# Patient Record
Sex: FEMALE | Race: WHITE | Employment: OTHER | URBAN - METROPOLITAN AREA
[De-identification: names, ages, dates, MRNs, and addresses within clinical notes are randomized per-mention and may not be internally consistent; named-entity substitution may affect disease eponyms.]

---

## 2017-03-01 ENCOUNTER — APPOINTMENT (OUTPATIENT)
Dept: GENERAL RADIOLOGY | Age: 82
End: 2017-03-01
Payer: MEDICARE

## 2017-03-01 ENCOUNTER — APPOINTMENT (OUTPATIENT)
Dept: CT IMAGING | Age: 82
End: 2017-03-01
Payer: MEDICARE

## 2017-03-01 ENCOUNTER — HOSPITAL ENCOUNTER (EMERGENCY)
Age: 82
Discharge: HOME | End: 2017-03-01
Attending: EMERGENCY MEDICINE
Payer: MEDICARE

## 2017-03-01 VITALS
BODY MASS INDEX: 23.54 KG/M2 | HEART RATE: 60 BPM | TEMPERATURE: 97.6 F | WEIGHT: 150 LBS | DIASTOLIC BLOOD PRESSURE: 55 MMHG | HEIGHT: 67 IN | RESPIRATION RATE: 16 BRPM | OXYGEN SATURATION: 98 % | SYSTOLIC BLOOD PRESSURE: 135 MMHG

## 2017-03-01 DIAGNOSIS — R29.6 FREQUENT FALLS: Primary | ICD-10-CM

## 2017-03-01 DIAGNOSIS — M54.2 NECK PAIN ON LEFT SIDE: ICD-10-CM

## 2017-03-01 DIAGNOSIS — R07.81 RIB PAIN ON RIGHT SIDE: ICD-10-CM

## 2017-03-01 DIAGNOSIS — R51.9 SCALP TENDERNESS: ICD-10-CM

## 2017-03-01 LAB
-: ABNORMAL
AMORPHOUS: ABNORMAL
BACTERIA: ABNORMAL
BILIRUBIN URINE: NEGATIVE
CASTS UA: ABNORMAL /LPF
CHP ED QC CHECK: YES
COLOR: YELLOW
COMMENT UA: ABNORMAL
CRYSTALS, UA: ABNORMAL /HPF
EPITHELIAL CELLS UA: ABNORMAL /HPF
GLUCOSE BLD-MCNC: 140 MG/DL
GLUCOSE BLD-MCNC: 140 MG/DL (ref 65–105)
GLUCOSE URINE: NEGATIVE
KETONES, URINE: NEGATIVE
LEUKOCYTE ESTERASE, URINE: NEGATIVE
MUCUS: ABNORMAL
NITRITE, URINE: NEGATIVE
OTHER OBSERVATIONS UA: ABNORMAL
PH UA: 5.5 (ref 5–8)
PROTEIN UA: NEGATIVE
RBC UA: ABNORMAL /HPF
RENAL EPITHELIAL, UA: ABNORMAL /HPF
SPECIFIC GRAVITY UA: 1.02 (ref 1–1.03)
TRICHOMONAS: ABNORMAL
TURBIDITY: ABNORMAL
URINE HGB: NEGATIVE
UROBILINOGEN, URINE: NORMAL
WBC UA: ABNORMAL /HPF
YEAST: ABNORMAL

## 2017-03-01 PROCEDURE — 99284 EMERGENCY DEPT VISIT MOD MDM: CPT

## 2017-03-01 PROCEDURE — 71020 XR CHEST STANDARD TWO VW: CPT

## 2017-03-01 PROCEDURE — 70450 CT HEAD/BRAIN W/O DYE: CPT

## 2017-03-01 PROCEDURE — 81001 URINALYSIS AUTO W/SCOPE: CPT

## 2017-03-01 PROCEDURE — 82947 ASSAY GLUCOSE BLOOD QUANT: CPT

## 2017-03-01 ASSESSMENT — ENCOUNTER SYMPTOMS
DIARRHEA: 0
COUGH: 0
BACK PAIN: 0
EYE PAIN: 0
NAUSEA: 0
SHORTNESS OF BREATH: 0
SORE THROAT: 0
ABDOMINAL PAIN: 0
VOMITING: 0

## 2017-03-01 ASSESSMENT — PAIN SCALES - GENERAL: PAINLEVEL_OUTOF10: 5

## 2017-04-21 ENCOUNTER — HOSPITAL ENCOUNTER (OUTPATIENT)
Age: 82
Discharge: HOME | End: 2017-04-21
Payer: MEDICARE

## 2017-04-21 ENCOUNTER — HOSPITAL ENCOUNTER (OUTPATIENT)
Dept: GENERAL RADIOLOGY | Age: 82
Discharge: HOME | End: 2017-04-21
Payer: MEDICARE

## 2017-04-21 DIAGNOSIS — R29.6 EXCESSIVE FALLING: ICD-10-CM

## 2017-04-21 DIAGNOSIS — M54.2 CERVICALGIA: ICD-10-CM

## 2017-04-21 PROCEDURE — 72052 X-RAY EXAM NECK SPINE 6/>VWS: CPT

## 2017-04-27 ENCOUNTER — HOSPITAL ENCOUNTER (OUTPATIENT)
Dept: CT IMAGING | Age: 82
Discharge: HOME | End: 2017-04-27
Payer: MEDICARE

## 2017-04-27 DIAGNOSIS — R29.6 FREQUENT FALLS: ICD-10-CM

## 2017-04-27 DIAGNOSIS — S09.90XS HEADACHES DUE TO OLD HEAD TRAUMA: ICD-10-CM

## 2017-04-27 DIAGNOSIS — G44.309 HEADACHES DUE TO OLD HEAD TRAUMA: ICD-10-CM

## 2017-04-27 PROCEDURE — 70450 CT HEAD/BRAIN W/O DYE: CPT

## 2017-06-07 ENCOUNTER — HOSPITAL ENCOUNTER (OUTPATIENT)
Dept: WOMENS IMAGING | Age: 82
Discharge: HOME | End: 2017-06-07
Payer: MEDICARE

## 2017-06-07 DIAGNOSIS — Z13.9 SCREENING: ICD-10-CM

## 2017-06-07 PROCEDURE — 77063 BREAST TOMOSYNTHESIS BI: CPT

## 2017-09-06 ENCOUNTER — OFFICE VISIT (OUTPATIENT)
Dept: GASTROENTEROLOGY | Age: 82
End: 2017-09-06
Payer: MEDICARE

## 2017-09-06 VITALS
SYSTOLIC BLOOD PRESSURE: 162 MMHG | DIASTOLIC BLOOD PRESSURE: 71 MMHG | TEMPERATURE: 97.3 F | HEIGHT: 67 IN | RESPIRATION RATE: 14 BRPM | HEART RATE: 62 BPM | OXYGEN SATURATION: 99 % | WEIGHT: 163 LBS | BODY MASS INDEX: 25.58 KG/M2

## 2017-09-06 DIAGNOSIS — K59.00 CONSTIPATION, UNSPECIFIED CONSTIPATION TYPE: Primary | ICD-10-CM

## 2017-09-06 DIAGNOSIS — D64.9 ANEMIA, UNSPECIFIED TYPE: ICD-10-CM

## 2017-09-06 DIAGNOSIS — R79.89 ELEVATED LFTS: ICD-10-CM

## 2017-09-06 PROCEDURE — 99204 OFFICE O/P NEW MOD 45 MIN: CPT | Performed by: INTERNAL MEDICINE

## 2017-09-06 RX ORDER — DOCUSATE SODIUM 100 MG/1
100 CAPSULE, LIQUID FILLED ORAL DAILY PRN
Qty: 30 CAPSULE | Refills: 3 | Status: SHIPPED | OUTPATIENT
Start: 2017-09-06 | End: 2017-09-06 | Stop reason: ALTCHOICE

## 2017-09-06 ASSESSMENT — ENCOUNTER SYMPTOMS
ABDOMINAL DISTENTION: 0
SINUS PRESSURE: 0
ABDOMINAL PAIN: 0
COUGH: 0
EYES NEGATIVE: 1
CONSTIPATION: 1
SORE THROAT: 0
ALLERGIC/IMMUNOLOGIC NEGATIVE: 1
TROUBLE SWALLOWING: 0
SHORTNESS OF BREATH: 0
RECTAL PAIN: 0
NAUSEA: 0
VOMITING: 0
BLOOD IN STOOL: 0
ANAL BLEEDING: 0
WHEEZING: 0
DIARRHEA: 0
BACK PAIN: 1
RESPIRATORY NEGATIVE: 1

## 2017-09-14 ENCOUNTER — HOSPITAL ENCOUNTER (OUTPATIENT)
Age: 82
Discharge: HOME | End: 2017-09-14
Payer: MEDICARE

## 2017-09-14 ENCOUNTER — HOSPITAL ENCOUNTER (OUTPATIENT)
Dept: CT IMAGING | Age: 82
Discharge: HOME | End: 2017-09-14
Payer: MEDICARE

## 2017-09-14 DIAGNOSIS — K59.00 CONSTIPATION, UNSPECIFIED CONSTIPATION TYPE: ICD-10-CM

## 2017-09-14 DIAGNOSIS — D64.9 ANEMIA, UNSPECIFIED TYPE: ICD-10-CM

## 2017-09-14 LAB
ABSOLUTE EOS #: 0.1 K/UL (ref 0–0.4)
ABSOLUTE LYMPH #: 0.9 K/UL (ref 1–4.8)
ABSOLUTE MONO #: 0.5 K/UL (ref 0.1–1.3)
ALBUMIN SERPL-MCNC: 4.2 G/DL (ref 3.5–5.2)
ALBUMIN/GLOBULIN RATIO: ABNORMAL (ref 1–2.5)
ALP BLD-CCNC: 87 U/L (ref 35–104)
ALT SERPL-CCNC: 11 U/L (ref 5–33)
ANION GAP SERPL CALCULATED.3IONS-SCNC: 9 MMOL/L (ref 9–17)
AST SERPL-CCNC: 16 U/L
BASOPHILS # BLD: 1 %
BASOPHILS ABSOLUTE: 0 K/UL (ref 0–0.2)
BILIRUB SERPL-MCNC: 0.51 MG/DL (ref 0.3–1.2)
BILIRUBIN DIRECT: 0.12 MG/DL
BILIRUBIN, INDIRECT: 0.39 MG/DL (ref 0–1)
BUN BLDV-MCNC: 34 MG/DL (ref 8–23)
BUN BLDV-MCNC: 34 MG/DL (ref 8–23)
CALCIUM SERPL-MCNC: 9.6 MG/DL (ref 8.6–10.4)
CHLORIDE BLD-SCNC: 95 MMOL/L (ref 98–107)
CO2: 30 MMOL/L (ref 20–31)
CREAT SERPL-MCNC: 1.07 MG/DL (ref 0.5–0.9)
CREAT SERPL-MCNC: 1.07 MG/DL (ref 0.5–0.9)
DIFFERENTIAL TYPE: ABNORMAL
EOSINOPHILS RELATIVE PERCENT: 2 %
FOLATE: >20 NG/ML
GFR AFRICAN AMERICAN: 58 ML/MIN
GFR AFRICAN AMERICAN: 58 ML/MIN
GFR NON-AFRICAN AMERICAN: 48 ML/MIN
GFR NON-AFRICAN AMERICAN: 48 ML/MIN
GFR SERPL CREATININE-BSD FRML MDRD: ABNORMAL ML/MIN/{1.73_M2}
GLUCOSE BLD-MCNC: 373 MG/DL (ref 70–99)
HCT VFR BLD CALC: 33.4 % (ref 36–46)
HEMOGLOBIN: 11.1 G/DL (ref 12–16)
LYMPHOCYTES # BLD: 18 %
MCH RBC QN AUTO: 29.7 PG (ref 26–34)
MCHC RBC AUTO-ENTMCNC: 33.3 G/DL (ref 31–37)
MCV RBC AUTO: 89.1 FL (ref 80–100)
MONOCYTES # BLD: 10 %
PDW BLD-RTO: 13.9 % (ref 11.5–14.9)
PLATELET # BLD: 200 K/UL (ref 150–450)
PLATELET ESTIMATE: ABNORMAL
PMV BLD AUTO: 7.7 FL (ref 6–12)
POTASSIUM SERPL-SCNC: 5 MMOL/L (ref 3.7–5.3)
RBC # BLD: 3.74 M/UL (ref 4–5.2)
RBC # BLD: ABNORMAL 10*6/UL
SEG NEUTROPHILS: 69 %
SEGMENTED NEUTROPHILS ABSOLUTE COUNT: 3.6 K/UL (ref 1.3–9.1)
SODIUM BLD-SCNC: 134 MMOL/L (ref 135–144)
THYROXINE, FREE: 1.01 NG/DL (ref 0.93–1.7)
TOTAL PROTEIN: 7 G/DL (ref 6.4–8.3)
TSH SERPL DL<=0.05 MIU/L-ACNC: 1.22 MIU/L (ref 0.3–5)
WBC # BLD: 5.1 K/UL (ref 3.5–11)
WBC # BLD: ABNORMAL 10*3/UL

## 2017-09-14 PROCEDURE — 84520 ASSAY OF UREA NITROGEN: CPT

## 2017-09-14 PROCEDURE — 6360000004 HC RX CONTRAST MEDICATION: Performed by: INTERNAL MEDICINE

## 2017-09-14 PROCEDURE — 74177 CT ABD & PELVIS W/CONTRAST: CPT

## 2017-09-14 PROCEDURE — 82565 ASSAY OF CREATININE: CPT

## 2017-09-14 PROCEDURE — 36415 COLL VENOUS BLD VENIPUNCTURE: CPT

## 2017-09-14 PROCEDURE — 84443 ASSAY THYROID STIM HORMONE: CPT

## 2017-09-14 PROCEDURE — 84439 ASSAY OF FREE THYROXINE: CPT

## 2017-09-14 PROCEDURE — 85025 COMPLETE CBC W/AUTO DIFF WBC: CPT

## 2017-09-14 PROCEDURE — 82746 ASSAY OF FOLIC ACID SERUM: CPT

## 2017-09-14 PROCEDURE — 2580000003 HC RX 258: Performed by: INTERNAL MEDICINE

## 2017-09-14 PROCEDURE — 82248 BILIRUBIN DIRECT: CPT

## 2017-09-14 PROCEDURE — 80053 COMPREHEN METABOLIC PANEL: CPT

## 2017-09-14 RX ORDER — 0.9 % SODIUM CHLORIDE 0.9 %
100 INTRAVENOUS SOLUTION INTRAVENOUS ONCE
Status: COMPLETED | OUTPATIENT
Start: 2017-09-14 | End: 2017-09-14

## 2017-09-14 RX ORDER — SODIUM CHLORIDE 0.9 % (FLUSH) 0.9 %
10 SYRINGE (ML) INJECTION PRN
Status: DISCONTINUED | OUTPATIENT
Start: 2017-09-14 | End: 2017-09-17 | Stop reason: HOSPADM

## 2017-09-14 RX ADMIN — IOHEXOL 50 ML: 240 INJECTION, SOLUTION INTRATHECAL; INTRAVASCULAR; INTRAVENOUS; ORAL at 16:19

## 2017-09-14 RX ADMIN — Medication 10 ML: at 16:20

## 2017-09-14 RX ADMIN — IOPAMIDOL 100 ML: 755 INJECTION, SOLUTION INTRAVENOUS at 16:18

## 2017-09-14 RX ADMIN — SODIUM CHLORIDE 100 ML: 9 INJECTION, SOLUTION INTRAVENOUS at 16:19

## 2017-10-04 ENCOUNTER — HOSPITAL ENCOUNTER (OUTPATIENT)
Dept: CT IMAGING | Age: 82
Discharge: HOME | End: 2017-10-04
Payer: MEDICARE

## 2017-10-04 DIAGNOSIS — R27.0 ATAXIA: ICD-10-CM

## 2017-10-04 PROCEDURE — 70450 CT HEAD/BRAIN W/O DYE: CPT

## 2017-10-19 ENCOUNTER — OFFICE VISIT (OUTPATIENT)
Dept: GASTROENTEROLOGY | Age: 82
End: 2017-10-19
Payer: MEDICARE

## 2017-10-19 VITALS
HEART RATE: 59 BPM | WEIGHT: 167 LBS | TEMPERATURE: 97.8 F | HEIGHT: 66 IN | RESPIRATION RATE: 14 BRPM | DIASTOLIC BLOOD PRESSURE: 55 MMHG | OXYGEN SATURATION: 98 % | SYSTOLIC BLOOD PRESSURE: 137 MMHG | BODY MASS INDEX: 26.84 KG/M2

## 2017-10-19 DIAGNOSIS — K86.2 PANCREATIC CYST: ICD-10-CM

## 2017-10-19 DIAGNOSIS — K59.00 CONSTIPATION, UNSPECIFIED CONSTIPATION TYPE: Primary | ICD-10-CM

## 2017-10-19 DIAGNOSIS — D64.9 ANEMIA, UNSPECIFIED TYPE: ICD-10-CM

## 2017-10-19 DIAGNOSIS — R79.89 ELEVATED LFTS: ICD-10-CM

## 2017-10-19 PROCEDURE — 1123F ACP DISCUSS/DSCN MKR DOCD: CPT | Performed by: INTERNAL MEDICINE

## 2017-10-19 PROCEDURE — G8484 FLU IMMUNIZE NO ADMIN: HCPCS | Performed by: INTERNAL MEDICINE

## 2017-10-19 PROCEDURE — G8427 DOCREV CUR MEDS BY ELIG CLIN: HCPCS | Performed by: INTERNAL MEDICINE

## 2017-10-19 PROCEDURE — 4040F PNEUMOC VAC/ADMIN/RCVD: CPT | Performed by: INTERNAL MEDICINE

## 2017-10-19 PROCEDURE — 1090F PRES/ABSN URINE INCON ASSESS: CPT | Performed by: INTERNAL MEDICINE

## 2017-10-19 PROCEDURE — G8419 CALC BMI OUT NRM PARAM NOF/U: HCPCS | Performed by: INTERNAL MEDICINE

## 2017-10-19 PROCEDURE — 1036F TOBACCO NON-USER: CPT | Performed by: INTERNAL MEDICINE

## 2017-10-19 PROCEDURE — G8599 NO ASA/ANTIPLAT THER USE RNG: HCPCS | Performed by: INTERNAL MEDICINE

## 2017-10-19 PROCEDURE — 99214 OFFICE O/P EST MOD 30 MIN: CPT | Performed by: INTERNAL MEDICINE

## 2017-10-19 ASSESSMENT — ENCOUNTER SYMPTOMS
ABDOMINAL PAIN: 0
CONSTIPATION: 1
BACK PAIN: 1
NAUSEA: 0
EYES NEGATIVE: 1
SINUS PRESSURE: 0
SHORTNESS OF BREATH: 0
SORE THROAT: 0
ANAL BLEEDING: 0
TROUBLE SWALLOWING: 0
RECTAL PAIN: 0
WHEEZING: 0
ABDOMINAL DISTENTION: 0
VOMITING: 0
DIARRHEA: 0
COUGH: 1
BLOOD IN STOOL: 0

## 2017-10-19 NOTE — PROGRESS NOTES
LABGLOM 51 (L) 10/27/2017    GFRAA >60 10/27/2017    GLOB NOT REPORTED 09/26/2014       Lab Results   Component Value Date    WBC 7.4 10/27/2017    HGB 12.0 10/27/2017    HCT 37.5 10/27/2017    MCV 91.8 10/27/2017     10/27/2017     Lab Results   Component Value Date    TSH 1.22 09/14/2017       patient\"s PMH/PSH,SH,PSYCH hx, MEDs, ALLERGIES, and ROS was all reviewed and updated ion the appropriate sections    EXAMINATION:   CT OF THE ABDOMEN AND PELVIS WITH CONTRAST 9/14/2017 4:20 pm       TECHNIQUE:   CT of the abdomen and pelvis was performed with the administration of   intravenous contrast. Multiplanar reformatted images are provided for review.    Dose modulation, iterative reconstruction, and/or weight based adjustment of   the mA/kV was utilized to reduce the radiation dose to as low as reasonably   achievable.       COMPARISON:   01/09/2015       HISTORY:   ORDERING SYSTEM PROVIDED HISTORY: Constipation, unspecified constipation type   TECHNOLOGIST PROVIDED HISTORY:   Ordering Physician Provided Reason for Exam: PT STATES SHE HAS HAD   CONSTIPATION FOR 2 YEARS.       FINDINGS:   Lower Chest: Pacemaker wires in place.  There is cardiomegaly.  No   significant lung infiltrates.       Organs: Liver and spleen show no significant abnormalities.  A 1.5 cm right   renal cyst unchanged.  A 1.1 cm cystic lesion in the pancreatic tail (series   2, image 47) unchanged.  A second similar 8 mm lesion seen more inferiorly on   image 54 is not well visualized on the prior exam probably due to volume   averaging due to difference in slice thickness.  Thicker slices on prior   exam.  No adrenal mass.  Gallbladder unremarkable.       GI/Bowel: Stomach decompressed and otherwise unremarkable.  Respiratory   motion in the upper abdomen limits evaluation of the structures.  Normal   caliber small bowel loops showing no focal lesions.  Terminal ileum   unremarkable.  Mild increased stool in the colon suggests Normocephalic. Mouth/Throat: No oropharyngeal exudate. Hearing problem    Eyes: Pupils are equal, round, and reactive to light. No scleral icterus. Neck: Normal range of motion. Neck supple. No JVD present. No tracheal deviation present. No thyromegaly present. Cardiovascular: Normal rate, regular rhythm, normal heart sounds and intact distal pulses. No murmur heard. Pulmonary/Chest: Effort normal and breath sounds normal. No respiratory distress. She has no wheezes. Abdominal: Soft. Bowel sounds are normal. She exhibits no distension. There is no tenderness. There is no rebound and no guarding. No ascites   Musculoskeletal: Normal range of motion. She exhibits no edema. Neurological: She is alert and oriented to person, place, and time. She has normal reflexes. Skin: Skin is warm. No rash noted. She is not diaphoretic. No erythema. No pallor. She is not diaphoretic   Psychiatric: She has a normal mood and affect. Her behavior is normal. Judgment and thought content normal.   Nursing note and vitals reviewed. Assessment:      1. Constipation, unspecified constipation type  COLONOSCOPY W/ OR W/O BIOPSY   2. Anemia, unspecified type  COLONOSCOPY W/ OR W/O BIOPSY    EGD   3. Elevated LFTs     4. Pancreatic cyst             Plan:       Will proceed with a jacques   Continue Dolcilax   lfst are normal now    ct Result as above will continue to watch though cyst with this patient's age of 80

## 2017-10-27 ENCOUNTER — HOSPITAL ENCOUNTER (OUTPATIENT)
Dept: PREADMISSION TESTING | Age: 82
Discharge: HOME | End: 2017-10-27
Payer: MEDICARE

## 2017-10-27 ENCOUNTER — ANESTHESIA EVENT (OUTPATIENT)
Dept: OPERATING ROOM | Age: 82
End: 2017-10-27
Payer: MEDICARE

## 2017-10-27 VITALS
HEIGHT: 66 IN | HEART RATE: 60 BPM | WEIGHT: 162 LBS | SYSTOLIC BLOOD PRESSURE: 123 MMHG | DIASTOLIC BLOOD PRESSURE: 59 MMHG | BODY MASS INDEX: 26.03 KG/M2 | OXYGEN SATURATION: 97 % | RESPIRATION RATE: 16 BRPM | TEMPERATURE: 97.7 F

## 2017-10-27 LAB
ABSOLUTE EOS #: 0.2 K/UL (ref 0–0.4)
ABSOLUTE IMMATURE GRANULOCYTE: NORMAL K/UL (ref 0–0.3)
ABSOLUTE LYMPH #: 1.3 K/UL (ref 1–4.8)
ABSOLUTE MONO #: 0.7 K/UL (ref 0.1–1.3)
ANION GAP SERPL CALCULATED.3IONS-SCNC: 10 MMOL/L (ref 9–17)
BASOPHILS # BLD: 1 %
BASOPHILS ABSOLUTE: 0.1 K/UL (ref 0–0.2)
BUN BLDV-MCNC: 31 MG/DL (ref 8–23)
BUN/CREAT BLD: ABNORMAL (ref 9–20)
CALCIUM SERPL-MCNC: 9.5 MG/DL (ref 8.6–10.4)
CHLORIDE BLD-SCNC: 101 MMOL/L (ref 98–107)
CO2: 28 MMOL/L (ref 20–31)
CREAT SERPL-MCNC: 1.01 MG/DL (ref 0.5–0.9)
DIFFERENTIAL TYPE: NORMAL
EKG ATRIAL RATE: 60 BPM
EKG P-R INTERVAL: 206 MS
EKG Q-T INTERVAL: 446 MS
EKG QRS DURATION: 92 MS
EKG QTC CALCULATION (BAZETT): 446 MS
EKG R AXIS: 49 DEGREES
EKG T AXIS: 45 DEGREES
EKG VENTRICULAR RATE: 60 BPM
EOSINOPHILS RELATIVE PERCENT: 2 %
GFR AFRICAN AMERICAN: >60 ML/MIN
GFR NON-AFRICAN AMERICAN: 51 ML/MIN
GFR SERPL CREATININE-BSD FRML MDRD: ABNORMAL ML/MIN/{1.73_M2}
GFR SERPL CREATININE-BSD FRML MDRD: ABNORMAL ML/MIN/{1.73_M2}
GLUCOSE BLD-MCNC: 127 MG/DL (ref 70–99)
HCT VFR BLD CALC: 37.5 % (ref 36–46)
HEMOGLOBIN: 12 G/DL (ref 12–16)
IMMATURE GRANULOCYTES: NORMAL %
LYMPHOCYTES # BLD: 17 %
MCH RBC QN AUTO: 29.5 PG (ref 26–34)
MCHC RBC AUTO-ENTMCNC: 32.1 G/DL (ref 31–37)
MCV RBC AUTO: 91.8 FL (ref 80–100)
MONOCYTES # BLD: 10 %
PDW BLD-RTO: 14 % (ref 11.5–14.9)
PLATELET # BLD: 210 K/UL (ref 150–450)
PLATELET ESTIMATE: NORMAL
PMV BLD AUTO: 7.9 FL (ref 6–12)
POTASSIUM SERPL-SCNC: 4.6 MMOL/L (ref 3.7–5.3)
RBC # BLD: 4.08 M/UL (ref 4–5.2)
RBC # BLD: NORMAL 10*6/UL
SEG NEUTROPHILS: 70 %
SEGMENTED NEUTROPHILS ABSOLUTE COUNT: 5.1 K/UL (ref 1.3–9.1)
SODIUM BLD-SCNC: 139 MMOL/L (ref 135–144)
WBC # BLD: 7.4 K/UL (ref 3.5–11)
WBC # BLD: NORMAL 10*3/UL

## 2017-10-27 PROCEDURE — 80048 BASIC METABOLIC PNL TOTAL CA: CPT

## 2017-10-27 PROCEDURE — 93005 ELECTROCARDIOGRAM TRACING: CPT

## 2017-10-27 PROCEDURE — 85025 COMPLETE CBC W/AUTO DIFF WBC: CPT

## 2017-10-27 PROCEDURE — 36415 COLL VENOUS BLD VENIPUNCTURE: CPT

## 2017-10-27 RX ORDER — SODIUM CHLORIDE 9 MG/ML
INJECTION, SOLUTION INTRAVENOUS CONTINUOUS
Status: CANCELLED | OUTPATIENT
Start: 2017-10-27

## 2017-10-27 RX ORDER — SODIUM CHLORIDE 0.9 % (FLUSH) 0.9 %
10 SYRINGE (ML) INJECTION PRN
Status: CANCELLED | OUTPATIENT
Start: 2017-10-27

## 2017-10-27 RX ORDER — SODIUM CHLORIDE 0.9 % (FLUSH) 0.9 %
10 SYRINGE (ML) INJECTION EVERY 12 HOURS SCHEDULED
Status: CANCELLED | OUTPATIENT
Start: 2017-10-27

## 2017-10-27 NOTE — H&P
HISTORY and [facility] [address]       NAME:  Martha Barrett  MRN: [id number]   YOB: 1927   Date: 10/27/2017   Age: 80 y.o. Gender: female       COMPLAINT AND PRESENT HISTORY:   Martha Barrett is 80 y.o.,   female, having a EGD/colonoscopy. Pt states she has hx of EGD/Colonoscopy about 10 years ago. Pt denies any GI bleeding or rectal pain. Patient has a history of colon polyps removed 10 years ago. Pt states she has hx of constipation and is unable to have a bowel movement without a stool softener. She states she has abdominal bloating and at times cramping. She has hx of GERD. Patient denies any other GI symptoms. No nausea / vomiting, No diarrhea. No recent changes in the color, caliber or consistency of the stools. No fever or chills.   No Hx of MRSA infections in the past.    PAST MEDICAL HISTORY     Past Medical History:   Diagnosis Date    Anxiety     At high risk for falls     Atrial fibrillation (HCC)     Caffeine use none    Cancer (HCC)     Cervical cancer (HCC)     CHF (congestive heart failure) (HCC)     Chronic back pain     Closed fracture of cervical vertebra without mention of spinal cord injury     Constipation     DDD (degenerative disc disease)     Depression     DJD (degenerative joint disease)     GERD (gastroesophageal reflux disease)     Hearing loss     Hematuria     History of blood transfusion     Hypertension     Multiple falls     Neuropathy in diabetes ([facility] [address]     OA (osteoarthritis)     Osteoarthritis     Pulmonary hypertension 9/30/2014    Type II or unspecified type diabetes mellitus without mention of complication, not stated as uncontrolled     Urinary incontinence     Uterine cancer ([facility] [address]     UTI (lower urinary tract infection) - Enterococcus  1/14/2015    Wears hearing aid in both ears     Wears partial dentures     upper     Pt denies any history of stroke, COPD, Asthma, Seizures,Thyroid disease, Kidney Disease, frequency 06/04/2015    Acute kidney injury ([address]     Altered mental status     Hypoglycemia     Hyponatremia     Opiate overdose     Acquired spondylolisthesis 01/19/2015    Spinal stenosis, lumbar region, without neurogenic claudication 01/19/2015    Degeneration of lumbar or lumbosacral intervertebral disc 01/19/2015    Lower urinary tract infectious disease 01/14/2015    Falling episodes 01/13/2015    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    Lumbar spondylosis 01/13/2015    Hyperglycemia 01/10/2015    Uncontrolled hypertension 01/10/2015    Type II or unspecified type diabetes mellitus without mention of complication, not stated as uncontrolled     Uterine cancer ([address]     DDD (degenerative disc disease)     H/O mitral valve repair     History of CHF (congestive heart failure)     Multiple falls     Compression fracture of L3 lumbar vertebra (HCC)     HTN (hypertension) 10/10/2014    Carotid stenosis 10/10/2014    Pulmonary hypertension 09/30/2014    Elevated troponin 09/27/2014    Osteoarthritis     Anxiety     Depression     Hearing loss     Neuropathy in diabetes ([facility] [address]     Troponin level elevated     Generalized weakness     Closed fracture of shaft of metacarpal bone 03/20/2014    DM (diabetes mellitus), type 2, uncontrolled ([address] 10/08/2013    Thoracic compression fracture 06/24/2013    Lumbar spinal stenosis 01/15/2013    Spondylolisthesis of lumbar region 01/15/2013    Pubic ramus fracture 01/15/2013           Kostas Gardner NP on 10/27/2017 at 11:51 AM

## 2017-11-01 ENCOUNTER — ANESTHESIA (OUTPATIENT)
Dept: OPERATING ROOM | Age: 82
End: 2017-11-01
Payer: MEDICARE

## 2017-11-01 ENCOUNTER — HOSPITAL ENCOUNTER (OUTPATIENT)
Age: 82
Setting detail: OUTPATIENT SURGERY
Discharge: HOME | End: 2017-11-01
Attending: INTERNAL MEDICINE | Admitting: INTERNAL MEDICINE
Payer: MEDICARE

## 2017-11-01 VITALS — DIASTOLIC BLOOD PRESSURE: 74 MMHG | OXYGEN SATURATION: 100 % | SYSTOLIC BLOOD PRESSURE: 169 MMHG

## 2017-11-01 VITALS
SYSTOLIC BLOOD PRESSURE: 149 MMHG | HEART RATE: 59 BPM | BODY MASS INDEX: 26.03 KG/M2 | TEMPERATURE: 97.5 F | HEIGHT: 66 IN | RESPIRATION RATE: 13 BRPM | OXYGEN SATURATION: 99 % | DIASTOLIC BLOOD PRESSURE: 88 MMHG | WEIGHT: 162 LBS

## 2017-11-01 LAB
GLUCOSE BLD-MCNC: 54 MG/DL (ref 65–105)
GLUCOSE BLD-MCNC: 61 MG/DL (ref 65–105)
GLUCOSE BLD-MCNC: 76 MG/DL (ref 65–105)

## 2017-11-01 PROCEDURE — 3609027000 HC COLONOSCOPY: Performed by: INTERNAL MEDICINE

## 2017-11-01 PROCEDURE — 2500000003 HC RX 250 WO HCPCS: Performed by: ANESTHESIOLOGY

## 2017-11-01 PROCEDURE — 7100000001 HC PACU RECOVERY - ADDTL 15 MIN: Performed by: INTERNAL MEDICINE

## 2017-11-01 PROCEDURE — 3609012400 HC EGD TRANSORAL BIOPSY SINGLE/MULTIPLE: Performed by: INTERNAL MEDICINE

## 2017-11-01 PROCEDURE — 3700000000 HC ANESTHESIA ATTENDED CARE: Performed by: INTERNAL MEDICINE

## 2017-11-01 PROCEDURE — 7100000031 HC ASPR PHASE II RECOVERY - ADDTL 15 MIN: Performed by: INTERNAL MEDICINE

## 2017-11-01 PROCEDURE — 6360000002 HC RX W HCPCS: Performed by: ANESTHESIOLOGY

## 2017-11-01 PROCEDURE — 2580000003 HC RX 258: Performed by: ANESTHESIOLOGY

## 2017-11-01 PROCEDURE — 43239 EGD BIOPSY SINGLE/MULTIPLE: CPT | Performed by: INTERNAL MEDICINE

## 2017-11-01 PROCEDURE — 45378 DIAGNOSTIC COLONOSCOPY: CPT | Performed by: INTERNAL MEDICINE

## 2017-11-01 PROCEDURE — 82947 ASSAY GLUCOSE BLOOD QUANT: CPT

## 2017-11-01 PROCEDURE — 3700000001 HC ADD 15 MINUTES (ANESTHESIA): Performed by: INTERNAL MEDICINE

## 2017-11-01 PROCEDURE — 88342 IMHCHEM/IMCYTCHM 1ST ANTB: CPT

## 2017-11-01 PROCEDURE — 7100000030 HC ASPR PHASE II RECOVERY - FIRST 15 MIN: Performed by: INTERNAL MEDICINE

## 2017-11-01 PROCEDURE — 7100000000 HC PACU RECOVERY - FIRST 15 MIN: Performed by: INTERNAL MEDICINE

## 2017-11-01 PROCEDURE — 88305 TISSUE EXAM BY PATHOLOGIST: CPT

## 2017-11-01 RX ORDER — SODIUM CHLORIDE 9 MG/ML
INJECTION, SOLUTION INTRAVENOUS CONTINUOUS PRN
Status: DISCONTINUED | OUTPATIENT
Start: 2017-11-01 | End: 2017-11-01 | Stop reason: SDUPTHER

## 2017-11-01 RX ORDER — PROPOFOL 10 MG/ML
INJECTION, EMULSION INTRAVENOUS CONTINUOUS PRN
Status: DISCONTINUED | OUTPATIENT
Start: 2017-11-01 | End: 2017-11-01 | Stop reason: SDUPTHER

## 2017-11-01 RX ORDER — OXYCODONE HYDROCHLORIDE AND ACETAMINOPHEN 5; 325 MG/1; MG/1
1 TABLET ORAL PRN
Status: DISCONTINUED | OUTPATIENT
Start: 2017-11-01 | End: 2017-11-01 | Stop reason: HOSPADM

## 2017-11-01 RX ORDER — HYDROMORPHONE HCL 110MG/55ML
0.5 PATIENT CONTROLLED ANALGESIA SYRINGE INTRAVENOUS EVERY 5 MIN PRN
Status: DISCONTINUED | OUTPATIENT
Start: 2017-11-01 | End: 2017-11-01 | Stop reason: HOSPADM

## 2017-11-01 RX ORDER — PROMETHAZINE HYDROCHLORIDE 25 MG/ML
6.25 INJECTION, SOLUTION INTRAMUSCULAR; INTRAVENOUS
Status: DISCONTINUED | OUTPATIENT
Start: 2017-11-01 | End: 2017-11-01 | Stop reason: HOSPADM

## 2017-11-01 RX ORDER — SODIUM CHLORIDE 9 MG/ML
INJECTION, SOLUTION INTRAVENOUS CONTINUOUS
Status: DISCONTINUED | OUTPATIENT
Start: 2017-11-01 | End: 2017-11-01 | Stop reason: HOSPADM

## 2017-11-01 RX ORDER — OXYCODONE HYDROCHLORIDE AND ACETAMINOPHEN 5; 325 MG/1; MG/1
2 TABLET ORAL PRN
Status: DISCONTINUED | OUTPATIENT
Start: 2017-11-01 | End: 2017-11-01 | Stop reason: HOSPADM

## 2017-11-01 RX ORDER — LABETALOL HYDROCHLORIDE 5 MG/ML
5 INJECTION, SOLUTION INTRAVENOUS EVERY 10 MIN PRN
Status: DISCONTINUED | OUTPATIENT
Start: 2017-11-01 | End: 2017-11-01 | Stop reason: HOSPADM

## 2017-11-01 RX ORDER — SODIUM CHLORIDE 0.9 % (FLUSH) 0.9 %
10 SYRINGE (ML) INJECTION PRN
Status: DISCONTINUED | OUTPATIENT
Start: 2017-11-01 | End: 2017-11-01 | Stop reason: HOSPADM

## 2017-11-01 RX ORDER — SODIUM CHLORIDE 0.9 % (FLUSH) 0.9 %
10 SYRINGE (ML) INJECTION EVERY 12 HOURS SCHEDULED
Status: DISCONTINUED | OUTPATIENT
Start: 2017-11-01 | End: 2017-11-01 | Stop reason: HOSPADM

## 2017-11-01 RX ORDER — DIPHENHYDRAMINE HYDROCHLORIDE 50 MG/ML
12.5 INJECTION INTRAMUSCULAR; INTRAVENOUS
Status: DISCONTINUED | OUTPATIENT
Start: 2017-11-01 | End: 2017-11-01 | Stop reason: HOSPADM

## 2017-11-01 RX ORDER — LIDOCAINE HYDROCHLORIDE 20 MG/ML
INJECTION, SOLUTION INFILTRATION; PERINEURAL PRN
Status: DISCONTINUED | OUTPATIENT
Start: 2017-11-01 | End: 2017-11-01 | Stop reason: SDUPTHER

## 2017-11-01 RX ORDER — HYDROMORPHONE HCL 110MG/55ML
0.25 PATIENT CONTROLLED ANALGESIA SYRINGE INTRAVENOUS EVERY 5 MIN PRN
Status: DISCONTINUED | OUTPATIENT
Start: 2017-11-01 | End: 2017-11-01 | Stop reason: HOSPADM

## 2017-11-01 RX ADMIN — SODIUM CHLORIDE: 9 INJECTION, SOLUTION INTRAVENOUS at 11:05

## 2017-11-01 RX ADMIN — SODIUM CHLORIDE: 9 INJECTION, SOLUTION INTRAVENOUS at 11:23

## 2017-11-01 RX ADMIN — LIDOCAINE HYDROCHLORIDE 2 ML: 20 INJECTION, SOLUTION INFILTRATION; PERINEURAL at 11:30

## 2017-11-01 RX ADMIN — PROPOFOL 50 MCG/KG/MIN: 10 INJECTION, EMULSION INTRAVENOUS at 11:32

## 2017-11-01 ASSESSMENT — PAIN SCALES - GENERAL
PAINLEVEL_OUTOF10: 0
PAINLEVEL_OUTOF10: 0

## 2017-11-01 ASSESSMENT — PAIN - FUNCTIONAL ASSESSMENT: PAIN_FUNCTIONAL_ASSESSMENT: 0-10

## 2017-11-01 NOTE — INTERVAL H&P NOTE
HISTORY and [facility] [place] [address]       NAME:  Nurys May  MRN: [id number]   YOB: 1927   Date: 11/1/2017   Age: 80 y.o. Gender: female     H&P Update Note    H&P from 10/27/2017  reviewed and updated, Patient examined. Vitals: BP (!) 176/71   Pulse 58   Temp 97.2 °F (36.2 °C) (Oral)   Resp 16   Ht 5' 6\" (1.676 m)   Wt 162 lb (73.5 kg)   SpO2 98%   BMI 26.15 kg/m²  Body mass index is 26.15 kg/m². Pt is here today for EGD, colonoscopy. Pt denies headache, fever, chills, chest pain, SOB, nausea, vomiting. Bradycardiac,iregularly irregular rhythm, lungs are clear, abdomen is soft, non-tender, no bowel sounds present, AAO X 3. No interval changes. I concur with the findings.        Christy Leon PA-C on 11/1/2017 at 11:03 AM

## 2017-11-01 NOTE — OP NOTE
PROCEDURE NOTE    DATE OF PROCEDURE: 11/1/2017    SURGEON: Deana Cotton MD  Facility : [address] [facility]  ASSISTANT: None  Anesthesia: MAC  PREOPERATIVE DIAGNOSIS:   abd pain    anemia     POSTOPERATIVE DIAGNOSIS: as described below    OPERATION: Total colonoscopy     ANESTHESIA: Moderate Sedation    ESTIMATED BLOOD LOSS: less than 50     COMPLICATIONS: None. SPECIMENS:  Was Not Obtained    HISTORY: The patient is a 80y.o. year old female with history of above preop diagnosis. I recommended colonoscopy with possible biopsy or polypectomy and I explained the risk, benefits, expected outcome, and alternatives to the procedure. Risks included but are not limited to bleeding, infection, respiratory distress, hypotension, and perforation of the colon and possibility of missing a lesion. The patient understands and is in agreement. PROCEDURE: The patient was given IV conscious sedation. The patient's SPO2 remained above 90% throughout the procedure. The colonoscope was inserted per rectum and advanced under direct vision to the cecum without difficulty. The prep was poor. Findings:  Terminal ileum: normal    Cecum/Ascending colon: normal    Transverse colon: abnormal: diverticulosis     Descending/Sigmoid colon: abnormal: diverticulosis     Rectum/Anus: examined in normal and retroflexed positions and was normal    Withdrawal Time was (minutes): 10    The colon was decompressed and the scope was removed. The patient tolerated the procedure well. Recommendations/Plan:   1. Lifestyle and dietary modifications as discussed  2. F/U Biopsies  3. F/U In OfficeYes  4.  Discussed with the family      Electronically signed by Deana Cotton MD  on 11/1/2017 at 12:19 PM

## 2017-11-01 NOTE — ANESTHESIA POSTPROCEDURE EVALUATION
Department of Anesthesiology  Postprocedure Note    Patient: Antonella Norris  MRN: [id number]  YOB: 1927  Date of evaluation: 11/1/2017  Time:  12:30 PM     Procedure Summary     Date:  11/01/17 Room / Location:  [address] / [id number]    Anesthesia Start:  1123 Anesthesia Stop:  1230    Procedures:       EGD BIOPSY (N/A Esophagus)      COLONOSCOPY (N/A ) Diagnosis:  (CONSTIPATION AND GERD)    Surgeon:  Homer Lr MD Responsible Provider:  Dominic Ramirez MD    Anesthesia Type:  MAC ASA Status:  3          Anesthesia Type: MAC    Zina Phase I:      Zina Phase II:      Last vitals: Reviewed and per EMR flowsheets.        Anesthesia Post Evaluation    Patient location during evaluation: ICU  Patient participation: complete - patient participated  Level of consciousness: awake and alert  Airway patency: patent  Nausea & Vomiting: no vomiting  Complications: no  Cardiovascular status: hemodynamically stable  Respiratory status: acceptable and nasal cannula  Hydration status: euvolemic

## 2017-11-01 NOTE — OP NOTE
PROCEDURE NOTE    DATE OF PROCEDURE: 11/1/2017     SURGEON: Pedro Florentino MD  Facility: [facility]  ASSISTANT: None  Anesthesia: MAC  PREOPERATIVE DIAGNOSIS:   abd pain    anemia   Diagnosis:  Gastritis, mild , bxs taken     POSTOPERATIVE DIAGNOSIS: As described below    OPERATION: Upper GI endoscopy with Biopsy    ANESTHESIA: Moderate Sedation     ESTIMATED BLOOD LOSS: Less than 50 ml    COMPLICATIONS: None. SPECIMENS:  Was Obtained:   Gastritis, mild , bxs taken     HISTORY: The patient is a 80y.o. year old female with history of above preop diagnosis. I recommended esophagogastroduodenoscopy with possible biopsy and I explained the risk, benefits, expected outcome, and alternatives to the procedure. Risks included but are not limited to bleeding, infection, respiratory distress, hypotension, and perforation of the esophagus, stomach, or duodenum. Patient understands and is in agreement. PROCEDURE: The patient was given IV conscious sedation. The patient's SPO2 remained above 90% throughout the procedure. The gastroscope was inserted orally and advanced under direct vision through the esophagus, through the stomach, through the pylorus, and into the descending duodenum. Findings:    Retropharyngeal area was grossly normal appearing    Esophagus: normal    Stomach:    Fundus: abnormal: Gastritis, mild , bxs taken     Body: abnormal: Gastritis, mild , bxs taken     Antrum: abnormal: Gastritis, mild , bxs taken     Duodenum:     Descending: normal    Bulb: normal    The scope was removed and the patient tolerated the procedure well. Recommendations/Plan:   1. F/U Biopsies  2. F/U In Office in 3-4 weeks  3.  Discussed with the family    Electronically signed by Pedro Florentino MD  on 11/1/2017 at 11:39 AM

## 2017-11-01 NOTE — H&P (VIEW-ONLY)
HISTORY and [facility] [address]       NAME:  Dilan Rehman  MRN: [id number]   YOB: 1927   Date: 10/27/2017   Age: 80 y.o. Gender: female       COMPLAINT AND PRESENT HISTORY:   Dilan Rehman is 80 y.o.,   female, having a EGD/colonoscopy. Pt states she has hx of EGD/Colonoscopy about 10 years ago. Pt denies any GI bleeding or rectal pain. Patient has a history of colon polyps removed 10 years ago. Pt states she has hx of constipation and is unable to have a bowel movement without a stool softener. She states she has abdominal bloating and at times cramping. She has hx of GERD. Patient denies any other GI symptoms. No nausea / vomiting, No diarrhea. No recent changes in the color, caliber or consistency of the stools. No fever or chills.   No Hx of MRSA infections in the past.    PAST MEDICAL HISTORY     Past Medical History:   Diagnosis Date    Anxiety     At high risk for falls     Atrial fibrillation (HCC)     Caffeine use none    Cancer (HCC)     Cervical cancer (HCC)     CHF (congestive heart failure) (HCC)     Chronic back pain     Closed fracture of cervical vertebra without mention of spinal cord injury     Constipation     DDD (degenerative disc disease)     Depression     DJD (degenerative joint disease)     GERD (gastroesophageal reflux disease)     Hearing loss     Hematuria     History of blood transfusion     Hypertension     Multiple falls     Neuropathy in diabetes ([address]     OA (osteoarthritis)     Osteoarthritis     Pulmonary hypertension 9/30/2014    Type II or unspecified type diabetes mellitus without mention of complication, not stated as uncontrolled     Urinary incontinence     Uterine cancer ([address]     UTI (lower urinary tract infection) - Enterococcus  1/14/2015    Wears hearing aid in both ears     Wears partial dentures     upper     Pt denies any history of stroke, COPD, Asthma, Seizures,Thyroid disease, Kidney Disease, Hepatitis, TB, or Substance abuse. SURGICAL HISTORY       Past Surgical History:   Procedure Laterality Date    APPENDECTOMY      CARDIAC VALVE REPLACEMENT  2004    MITRAL VALVE repair    CAROTID ENDARTERECTOMY Left 10/09/14    COLONOSCOPY      CYSTOSCOPY      DIAGNOSTIC CARDIAC CATH LAB PROCEDURE      2004    ENDOSCOPY, COLON, DIAGNOSTIC      EYE SURGERY Bilateral     CATARACTS    FIXATION KYPHOPLASTY  10/06/2015    FRACTURE SURGERY      LEFT FINGER    HYSTERECTOMY      JOINT REPLACEMENT Bilateral     knees    LYMPH NODE DISSECTION      neck    PACEMAKER PLACEMENT  01/27/2009    [employer] pacemaker, by Dr. Harsh Landa History   Problem Relation Age of Onset    Cancer Mother      breast    Cancer Sister      kidney    Cancer Brother      melanoma    Other Father      black lung disease       SOCIAL HISTORY       Social History     Social History    Marital status:      Spouse name: N/A    Number of children: N/A    Years of education: N/A     Social History Main Topics    Smoking status: Never Smoker    Smokeless tobacco: Never Used    Alcohol use No    Drug use: No    Sexual activity: Not Asked     Other Topics Concern    None     Social History Narrative    None           REVIEW OF SYSTEMS      Allergies   Allergen Reactions    Sulfa Antibiotics Other (See Comments)     back aches tremers    Codeine Rash    Lidocaine Rash    Meperidine Rash    Penicillins Rash    Terramycin [Oxytetracycline] Rash       Current Outpatient Prescriptions on File Prior to Encounter   Medication Sig Dispense Refill    bisacodyl (DULCOLAX) 5 MG EC tablet Take 2 tablets by mouth daily as needed for Constipation Patient is to purchase Dulcolax tablets over the counter if not covered by [employer]. Take four Dulcolax tablets as directed. Please follow further instructions as listed on your Colonoscopy Preparation sheet.  30 tablet 3    trospium (SANCTURA) 20 MG tablet Take 1 tablet by mouth 2 times daily (Patient taking differently: Take 20 mg by mouth daily ) 60 tablet 11    insulin glargine (LANTUS) 100 UNIT/ML injection vial Inject 34 Units into the skin Daily. (Patient taking differently: Inject 30 Units into the skin Daily ) 1 vial 3    Insulin Aspart (NOVOLOG FLEXPEN SC) Inject 8 Units into the skin daily (with breakfast)       alendronate (FOSAMAX) 70 MG tablet Take 70 mg by mouth every 7 days Friday      furosemide (LASIX) 20 MG tablet Take 20 mg by mouth daily       escitalopram (LEXAPRO) 10 MG tablet Take 20 mg by mouth daily.  sotalol (BETAPACE) 80 MG tablet Take 80 mg by mouth daily       ACCU-CHEK SMARTVIEW strip   0    RA ALCOHOL SWABS 70 % PADS use as directed four times a day  0    UNIFINE PENTIPS 31G X 5 MM MISC       aspirin 325 MG tablet Take 1 tablet by mouth daily. 30 tablet 3     No current facility-administered medications on file prior to encounter. General health:  Fairly good. No fever or chills. Skin:  No itching, redness or rash. HEENT:  Hearing loss. No headache, epistaxis or sore throat. Neck:  No pain, stiffness or masses. Cardiovascular/Respiratory system:  No chest pain, palpitation or shortness of breath. Gastrointestinal tract: See HPI. Genitourinary:  No burning on micturition. No hesitancy, urgency, frequency or discoloration of urine. Locomotor:  Hx OA, uses can for ambulation. No bone or joint pains. No swelling. Neuropsychiatric:  No referable complaints. GENERAL PHYSICAL EXAM:     Vitals: BP (!) 123/59   Pulse 60   Temp 97.7 °F (36.5 °C) (Oral)   Resp 16   Ht 5' 6\" (1.676 m)   Wt 162 lb (73.5 kg)   SpO2 97%   BMI 26.15 kg/m²  Body mass index is 26.15 kg/m².        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    Lumbar spondylosis 01/13/2015    Hyperglycemia 01/10/2015    Uncontrolled hypertension 01/10/2015    Type II or unspecified type diabetes mellitus without mention of complication, not stated as uncontrolled     Uterine cancer ([address]     DDD (degenerative disc disease)     H/O mitral valve repair     History of CHF (congestive heart failure)     Multiple falls     Compression fracture of L3 lumbar vertebra (HCC)     HTN (hypertension) 10/10/2014    Carotid stenosis 10/10/2014    Pulmonary hypertension 09/30/2014    Elevated troponin 09/27/2014    Osteoarthritis     Anxiety     Depression     Hearing loss     Neuropathy in diabetes ([address]     Troponin level elevated     Generalized weakness     Closed fracture of shaft of metacarpal bone 03/20/2014    DM (diabetes mellitus), type 2, uncontrolled ([address] 10/08/2013    Thoracic compression fracture 06/24/2013    Lumbar spinal stenosis 01/15/2013    Spondylolisthesis of lumbar region 01/15/2013    Pubic ramus fracture 01/15/2013           Rakesh Godoy NP on 10/27/2017 at 11:51 AM

## 2017-11-01 NOTE — ANESTHESIA PRE PROCEDURE
Department of Anesthesiology  Preprocedure Note       Name:  Morena Mclaughlin   Age:  80 y.o.  :  1927                                          MRN:  [id number]         Date:  2017      Surgeon: Maricruz Escalante):  Noe Mondragon MD    Procedure: Procedure(s):  EGD ESOPHAGOGASTRODUODENOSCOPY  COLONOSCOPY    Medications prior to admission:   Prior to Admission medications    Medication Sig Start Date End Date Taking? Authorizing Provider   Insulin Aspart (NOVOLOG FLEXPEN SC) Inject 10 Units into the skin Daily with supper    Historical Provider, MD   bisacodyl (DULCOLAX) 5 MG EC tablet Take 2 tablets by mouth daily as needed for Constipation Patient is to purchase Dulcolax tablets over the counter if not covered by [employer]. Take four Dulcolax tablets as directed. Please follow further instructions as listed on your Colonoscopy Preparation sheet. 17   Maria De Jesus Maurer MD   trospium (SANCTURA) 20 MG tablet Take 1 tablet by mouth 2 times daily  Patient taking differently: Take 20 mg by mouth daily  17   Jacki Monreal MD   ACCU-CHEK SMARTVIEW strip  16   Historical Provider, MD MCKEON ALCOHOL SWABS 70 % PADS use as directed four times a day 16   Historical Provider, MD SPIVEY PENTIPS 31G X 5 MM MISC  9/15/15   Historical Provider, MD   insulin glargine (LANTUS) 100 UNIT/ML injection vial Inject 34 Units into the skin Daily. Patient taking differently: Inject 30 Units into the skin Daily  1/13/15   Elena Jose DO   aspirin 325 MG tablet Take 1 tablet by mouth daily. 14   Ruddy Peters DO   Insulin Aspart (NOVOLOG FLEXPEN SC) Inject 8 Units into the skin daily (with breakfast)     Historical Provider, MD   alendronate (FOSAMAX) 70 MG tablet Take 70 mg by mouth every 7 days Friday 10/6/13   Historical Provider, MD   furosemide (LASIX) 20 MG tablet Take 20 mg by mouth daily  13   Historical Provider, MD   escitalopram (LEXAPRO) 10 MG tablet Take 20 mg by mouth daily.  13 M51.37    Acute kidney injury ([facility] N17.9    Altered mental status R41.82    Hypoglycemia E16.2    Hyponatremia E87.1    Opiate overdose T40.601A    Acquired cyst of kidney N28.1    Microscopic hematuria R31.29    Urge incontinence N39.41    Urinary frequency R35.0    Anemia D64.9    Constipation K59.00    Elevated LFTs R79.89       Past Medical History:        Diagnosis Date    Anxiety     At high risk for falls     Atrial fibrillation (HCC)     Caffeine use none    Cancer (HCC)     Cervical cancer (HCC)     CHF (congestive heart failure) (HCC)     Chronic back pain     Closed fracture of cervical vertebra without mention of spinal cord injury     Constipation     DDD (degenerative disc disease)     Depression     DJD (degenerative joint disease)     GERD (gastroesophageal reflux disease)     Hearing loss     Hematuria     History of blood transfusion     Hypertension     Multiple falls     Neuropathy in diabetes ([facility] [address]     OA (osteoarthritis)     Osteoarthritis     Pulmonary hypertension 9/30/2014    Type II or unspecified type diabetes mellitus without mention of complication, not stated as uncontrolled     Urinary incontinence     Uterine cancer ([facility] [address]     UTI (lower urinary tract infection) - Enterococcus  1/14/2015    Wears hearing aid in both ears     Wears partial dentures     upper       Past Surgical History:        Procedure Laterality Date    APPENDECTOMY      CARDIAC VALVE REPLACEMENT  2004    MITRAL VALVE repair    CAROTID ENDARTERECTOMY Left 10/09/14    CATARACT REMOVAL WITH IMPLANT Bilateral     COLONOSCOPY      CYSTOSCOPY      DIAGNOSTIC CARDIAC CATH LAB PROCEDURE      2004    ENDOSCOPY, COLON, DIAGNOSTIC      EYE SURGERY Bilateral     CATARACTS    FIXATION KYPHOPLASTY  10/06/2015    FRACTURE SURGERY      LEFT FINGER    HYSTERECTOMY      JOINT REPLACEMENT Bilateral     knees    LYMPH NODE DISSECTION      neck    PACEMAKER PLACEMENT  01/27/2009 [employer] pacemaker, by Dr. Julio Bean       Social History:    Social History   Substance Use Topics    Smoking status: Never Smoker    Smokeless tobacco: Never Used    Alcohol use No                                Counseling given: Not Answered      Vital Signs (Current): There were no vitals filed for this visit. BP Readings from Last 3 Encounters:   10/27/17 (!) 123/59   10/19/17 (!) 137/55   09/06/17 (!) 162/71       NPO Status:                                                                                 BMI:   Wt Readings from Last 3 Encounters:   10/27/17 162 lb (73.5 kg)   10/19/17 167 lb (75.8 kg)   09/06/17 163 lb (73.9 kg)     There is no height or weight on file to calculate BMI.    CBC:   Lab Results   Component Value Date    WBC 7.4 10/27/2017    RBC 4.08 10/27/2017    RBC 3.72 05/30/2012    HGB 12.0 10/27/2017    HCT 37.5 10/27/2017    MCV 91.8 10/27/2017    RDW 14.0 10/27/2017     10/27/2017     05/30/2012       CMP:   Lab Results   Component Value Date     10/27/2017    K 4.6 10/27/2017     10/27/2017    CO2 28 10/27/2017    BUN 31 10/27/2017    CREATININE 1.01 10/27/2017    GFRAA >60 10/27/2017    LABGLOM 51 10/27/2017    GLUCOSE 127 10/27/2017    GLUCOSE 97 05/30/2012    PROT 7.0 09/14/2017    CALCIUM 9.5 10/27/2017    BILITOT 0.51 09/14/2017    ALKPHOS 87 09/14/2017    AST 16 09/14/2017    ALT 11 09/14/2017       POC Tests: No results for input(s): POCGLU, POCNA, POCK, POCCL, POCBUN, POCHEMO, POCHCT in the last 72 hours.     Coags:   Lab Results   Component Value Date    PROTIME 10.3 02/05/2015    INR 1.0 02/05/2015    APTT 28.3 01/19/2015       HCG (If Applicable): No results found for: PREGTESTUR, PREGSERUM, HCG, HCGQUANT     ABGs: No results found for: PHART, PO2ART, YPR6XCN, KFF5HMP, BEART, S8WUOSSB     Type & Screen (If Applicable):  No results found for: LABABO, [address]    Anesthesia Evaluation  Patient summary reviewed

## 2017-11-03 ENCOUNTER — HOSPITAL ENCOUNTER (OUTPATIENT)
Age: 82
Setting detail: OBSERVATION
Discharge: HOME | End: 2017-11-04
Attending: EMERGENCY MEDICINE | Admitting: INTERNAL MEDICINE
Payer: MEDICARE

## 2017-11-03 ENCOUNTER — APPOINTMENT (OUTPATIENT)
Dept: GENERAL RADIOLOGY | Age: 82
End: 2017-11-03
Payer: MEDICARE

## 2017-11-03 ENCOUNTER — TELEPHONE (OUTPATIENT)
Dept: GASTROENTEROLOGY | Age: 82
End: 2017-11-03

## 2017-11-03 DIAGNOSIS — E86.0 DEHYDRATION: ICD-10-CM

## 2017-11-03 DIAGNOSIS — R73.9 HYPERGLYCEMIA: Primary | ICD-10-CM

## 2017-11-03 LAB
-: ABNORMAL
ABSOLUTE EOS #: 0.2 K/UL (ref 0–0.4)
ABSOLUTE IMMATURE GRANULOCYTE: ABNORMAL K/UL (ref 0–0.3)
ABSOLUTE LYMPH #: 1.6 K/UL (ref 1–4.8)
ABSOLUTE MONO #: 0.9 K/UL (ref 0.1–1.3)
ALLEN TEST: ABNORMAL
AMORPHOUS: ABNORMAL
ANION GAP SERPL CALCULATED.3IONS-SCNC: 10 MMOL/L (ref 9–17)
BACTERIA: ABNORMAL
BASOPHILS # BLD: 1 %
BASOPHILS ABSOLUTE: 0.1 K/UL (ref 0–0.2)
BETA-HYDROXYBUTYRATE: 0.06 MMOL/L (ref 0.02–0.27)
BILIRUBIN URINE: NEGATIVE
BUN BLDV-MCNC: 42 MG/DL (ref 8–23)
BUN/CREAT BLD: ABNORMAL (ref 9–20)
CALCIUM SERPL-MCNC: 9.7 MG/DL (ref 8.6–10.4)
CARBOXYHEMOGLOBIN: 4.8 % (ref 0–5)
CASTS UA: ABNORMAL /LPF
CHLORIDE BLD-SCNC: 91 MMOL/L (ref 98–107)
CHP ED QC CHECK: NORMAL
CO2: 29 MMOL/L (ref 20–31)
COLOR: YELLOW
COMMENT UA: ABNORMAL
CREAT SERPL-MCNC: 1.17 MG/DL (ref 0.5–0.9)
CRYSTALS, UA: ABNORMAL /HPF
DIFFERENTIAL TYPE: ABNORMAL
EOSINOPHILS RELATIVE PERCENT: 2 %
EPITHELIAL CELLS UA: ABNORMAL /HPF
FIO2: ABNORMAL
GFR AFRICAN AMERICAN: 53 ML/MIN
GFR NON-AFRICAN AMERICAN: 43 ML/MIN
GFR SERPL CREATININE-BSD FRML MDRD: ABNORMAL ML/MIN/{1.73_M2}
GFR SERPL CREATININE-BSD FRML MDRD: ABNORMAL ML/MIN/{1.73_M2}
GLUCOSE BLD-MCNC: 322 MG/DL
GLUCOSE BLD-MCNC: 322 MG/DL (ref 65–105)
GLUCOSE BLD-MCNC: 372 MG/DL (ref 65–105)
GLUCOSE BLD-MCNC: 432 MG/DL (ref 70–99)
GLUCOSE URINE: ABNORMAL
HCO3 VENOUS: 28.7 MMOL/L (ref 24–30)
HCT VFR BLD CALC: 34.7 % (ref 36–46)
HEMOGLOBIN: 11.7 G/DL (ref 12–16)
IMMATURE GRANULOCYTES: ABNORMAL %
KETONES, URINE: NEGATIVE
LEUKOCYTE ESTERASE, URINE: NEGATIVE
LYMPHOCYTES # BLD: 16 %
MCH RBC QN AUTO: 29.7 PG (ref 26–34)
MCHC RBC AUTO-ENTMCNC: 33.8 G/DL (ref 31–37)
MCV RBC AUTO: 87.8 FL (ref 80–100)
METHEMOGLOBIN: 0.5 % (ref 0–1.9)
MODE: ABNORMAL
MONOCYTES # BLD: 10 %
MUCUS: ABNORMAL
NEGATIVE BASE EXCESS, VEN: ABNORMAL MMOL/L (ref 0–2)
NITRITE, URINE: NEGATIVE
NOTIFICATION TIME: ABNORMAL
NOTIFICATION: ABNORMAL
O2 DEVICE/FLOW/%: ABNORMAL
O2 SAT, VEN: 44.3 % (ref 60–85)
OTHER OBSERVATIONS UA: ABNORMAL
OXYHEMOGLOBIN: ABNORMAL % (ref 95–98)
PATIENT TEMP: 37
PCO2, VEN, TEMP ADJ: ABNORMAL MMHG (ref 39–55)
PCO2, VEN: 38.9 (ref 39–55)
PDW BLD-RTO: 14.2 % (ref 11.5–14.9)
PEEP/CPAP: ABNORMAL
PH UA: 5.5 (ref 5–8)
PH VENOUS: 7.48 (ref 7.32–7.42)
PH, VEN, TEMP ADJ: ABNORMAL (ref 7.32–7.42)
PLATELET # BLD: 223 K/UL (ref 150–450)
PLATELET ESTIMATE: ABNORMAL
PMV BLD AUTO: 7.9 FL (ref 6–12)
PO2, VEN, TEMP ADJ: ABNORMAL MMHG (ref 30–50)
PO2, VEN: 21.4 (ref 30–50)
POSITIVE BASE EXCESS, VEN: 5.1 MMOL/L (ref 0–2)
POTASSIUM SERPL-SCNC: 4.1 MMOL/L (ref 3.7–5.3)
PROTEIN UA: NEGATIVE
PSV: ABNORMAL
PT. POSITION: ABNORMAL
RBC # BLD: 3.96 M/UL (ref 4–5.2)
RBC # BLD: ABNORMAL 10*6/UL
RBC UA: ABNORMAL /HPF
RENAL EPITHELIAL, UA: ABNORMAL /HPF
RESPIRATORY RATE: ABNORMAL
SAMPLE SITE: ABNORMAL
SEG NEUTROPHILS: 71 %
SEGMENTED NEUTROPHILS ABSOLUTE COUNT: 7 K/UL (ref 1.3–9.1)
SET RATE: ABNORMAL
SODIUM BLD-SCNC: 130 MMOL/L (ref 135–144)
SPECIFIC GRAVITY UA: 1.03 (ref 1–1.03)
TEXT FOR RESPIRATORY: ABNORMAL
TOTAL HB: ABNORMAL G/DL (ref 12–16)
TOTAL RATE: ABNORMAL
TRICHOMONAS: ABNORMAL
TURBIDITY: CLEAR
URINE HGB: NEGATIVE
UROBILINOGEN, URINE: NORMAL
VT: ABNORMAL
WBC # BLD: 9.8 K/UL (ref 3.5–11)
WBC # BLD: ABNORMAL 10*3/UL
WBC UA: ABNORMAL /HPF
YEAST: ABNORMAL

## 2017-11-03 PROCEDURE — 99285 EMERGENCY DEPT VISIT HI MDM: CPT

## 2017-11-03 PROCEDURE — 96361 HYDRATE IV INFUSION ADD-ON: CPT

## 2017-11-03 PROCEDURE — 93005 ELECTROCARDIOGRAM TRACING: CPT

## 2017-11-03 PROCEDURE — 81001 URINALYSIS AUTO W/SCOPE: CPT

## 2017-11-03 PROCEDURE — 80048 BASIC METABOLIC PNL TOTAL CA: CPT

## 2017-11-03 PROCEDURE — 82800 BLOOD PH: CPT

## 2017-11-03 PROCEDURE — 85025 COMPLETE CBC W/AUTO DIFF WBC: CPT

## 2017-11-03 PROCEDURE — 6370000000 HC RX 637 (ALT 250 FOR IP): Performed by: EMERGENCY MEDICINE

## 2017-11-03 PROCEDURE — G0378 HOSPITAL OBSERVATION PER HR: HCPCS

## 2017-11-03 PROCEDURE — 36415 COLL VENOUS BLD VENIPUNCTURE: CPT

## 2017-11-03 PROCEDURE — 96360 HYDRATION IV INFUSION INIT: CPT

## 2017-11-03 PROCEDURE — 82947 ASSAY GLUCOSE BLOOD QUANT: CPT

## 2017-11-03 PROCEDURE — 2580000003 HC RX 258: Performed by: EMERGENCY MEDICINE

## 2017-11-03 PROCEDURE — 82010 KETONE BODYS QUAN: CPT

## 2017-11-03 PROCEDURE — 96372 THER/PROPH/DIAG INJ SC/IM: CPT

## 2017-11-03 PROCEDURE — 71010 XR CHEST PORTABLE: CPT

## 2017-11-03 PROCEDURE — 82805 BLOOD GASES W/O2 SATURATION: CPT

## 2017-11-03 RX ORDER — 0.9 % SODIUM CHLORIDE 0.9 %
1000 INTRAVENOUS SOLUTION INTRAVENOUS ONCE
Status: COMPLETED | OUTPATIENT
Start: 2017-11-03 | End: 2017-11-03

## 2017-11-03 RX ADMIN — SODIUM CHLORIDE 1000 ML: 9 INJECTION, SOLUTION INTRAVENOUS at 19:27

## 2017-11-03 RX ADMIN — SODIUM CHLORIDE 1000 ML: 9 INJECTION, SOLUTION INTRAVENOUS at 20:30

## 2017-11-03 RX ADMIN — DILTIAZEM HYDROCHLORIDE 30 MG: 30 TABLET, FILM COATED ORAL at 21:49

## 2017-11-03 RX ADMIN — INSULIN LISPRO 5 UNITS: 100 INJECTION, SOLUTION INTRAVENOUS; SUBCUTANEOUS at 21:55

## 2017-11-03 ASSESSMENT — ENCOUNTER SYMPTOMS
ABDOMINAL PAIN: 0
VOMITING: 0
DIARRHEA: 0
EYE REDNESS: 0
RHINORRHEA: 0
NAUSEA: 0
SHORTNESS OF BREATH: 0
CONSTIPATION: 0
EYE PAIN: 0
COUGH: 0

## 2017-11-03 NOTE — ED PROVIDER NOTES
[address] ED  Emergency Department Encounter  Emergency Medicine Resident     Pt Name: Martha Barrett  MRN: [id number]  Xaviergflawrence 8/25/1927  Date of evaluation: 11/3/17  PCP:  Tye Sahu MD    [address] [place] [address]       Chief Complaint   Patient presents with    Hyperglycemia       HISTORY OF PRESENT ILLNESS  (Location/Symptom, Timing/Onset, Context/Setting, Quality, Duration, Modifying Factors, Severity.)      Martha Barrett is a 80 y.o. female who presents with vague complaint of feeling ill today. Patient's blood sugar was checked today and was over 500. Don't note from patient's GI doctor encourage patient to come to ED. Patient states she just was not feeling well earlier today but denies any complaints at this time. Patient denies any shortness of breath, chest pain, fevers, nausea, or vomiting. He states she has been more thirsty than normal today but is urinating per normal.  Patient states she's been taking her insulin as prescribed and denies missing any doses. Patient does have history of diabetes as well has multiple chronic medical conditions. Patient lives alone at home and denies any issues taken care of herself. PAST MEDICAL / SURGICAL / SOCIAL / FAMILY HISTORY      has a past medical history of Anxiety; At high risk for falls; Atrial fibrillation ([address] Caffeine use; Cancer ([address] Cervical cancer ([address] CHF (congestive heart failure) ([address] Chronic back pain; Closed fracture of cervical vertebra without mention of spinal cord injury; Constipation; DDD (degenerative disc disease); Depression; DJD (degenerative joint disease); GERD (gastroesophageal reflux disease); Hearing loss; Hematuria; History of blood transfusion; Hypertension; Multiple falls; Neuropathy in diabetes ([address] OA (osteoarthritis);  Osteoarthritis; Pulmonary hypertension; Type II or unspecified type diabetes mellitus without mention of complication, not stated as uncontrolled; Urinary incontinence; Uterine
have had a face to face evaluation of this patient and have completed at least one if not all key elements of the E/M (history, physical exam, and MDM). Additional findings are as noted.     Claudell Harvest, MD  Attending Emergency  Physician        Obey Swanson MD  11/04/17 [id number]

## 2017-11-04 VITALS
SYSTOLIC BLOOD PRESSURE: 150 MMHG | BODY MASS INDEX: 25.47 KG/M2 | WEIGHT: 162.26 LBS | HEIGHT: 67 IN | DIASTOLIC BLOOD PRESSURE: 72 MMHG | HEART RATE: 69 BPM | RESPIRATION RATE: 20 BRPM | TEMPERATURE: 97.5 F | OXYGEN SATURATION: 100 %

## 2017-11-04 LAB
ANION GAP SERPL CALCULATED.3IONS-SCNC: 11 MMOL/L (ref 9–17)
BUN BLDV-MCNC: 29 MG/DL (ref 8–23)
BUN/CREAT BLD: ABNORMAL (ref 9–20)
CALCIUM SERPL-MCNC: 9 MG/DL (ref 8.6–10.4)
CHLORIDE BLD-SCNC: 103 MMOL/L (ref 98–107)
CO2: 22 MMOL/L (ref 20–31)
CREAT SERPL-MCNC: 0.85 MG/DL (ref 0.5–0.9)
EKG ATRIAL RATE: 66 BPM
EKG P AXIS: 52 DEGREES
EKG P-R INTERVAL: 196 MS
EKG Q-T INTERVAL: 458 MS
EKG QRS DURATION: 88 MS
EKG QTC CALCULATION (BAZETT): 480 MS
EKG R AXIS: 43 DEGREES
EKG T AXIS: 33 DEGREES
EKG VENTRICULAR RATE: 66 BPM
GFR AFRICAN AMERICAN: >60 ML/MIN
GFR NON-AFRICAN AMERICAN: >60 ML/MIN
GFR SERPL CREATININE-BSD FRML MDRD: ABNORMAL ML/MIN/{1.73_M2}
GFR SERPL CREATININE-BSD FRML MDRD: ABNORMAL ML/MIN/{1.73_M2}
GLUCOSE BLD-MCNC: 118 MG/DL (ref 65–105)
GLUCOSE BLD-MCNC: 123 MG/DL (ref 70–99)
GLUCOSE BLD-MCNC: 207 MG/DL (ref 65–105)
GLUCOSE BLD-MCNC: 247 MG/DL (ref 65–105)
GLUCOSE BLD-MCNC: 312 MG/DL (ref 65–105)
GLUCOSE BLD-MCNC: 442 MG/DL (ref 65–105)
GLUCOSE BLD-MCNC: 461 MG/DL (ref 65–105)
MAGNESIUM: 2.1 MG/DL (ref 1.6–2.6)
POTASSIUM SERPL-SCNC: 3.4 MMOL/L (ref 3.7–5.3)
SODIUM BLD-SCNC: 136 MMOL/L (ref 135–144)

## 2017-11-04 PROCEDURE — 6370000000 HC RX 637 (ALT 250 FOR IP): Performed by: INTERNAL MEDICINE

## 2017-11-04 PROCEDURE — 82947 ASSAY GLUCOSE BLOOD QUANT: CPT

## 2017-11-04 PROCEDURE — 99236 HOSP IP/OBS SAME DATE HI 85: CPT | Performed by: INTERNAL MEDICINE

## 2017-11-04 PROCEDURE — 97161 PT EVAL LOW COMPLEX 20 MIN: CPT

## 2017-11-04 PROCEDURE — 83735 ASSAY OF MAGNESIUM: CPT

## 2017-11-04 PROCEDURE — 2580000003 HC RX 258: Performed by: INTERNAL MEDICINE

## 2017-11-04 PROCEDURE — 36415 COLL VENOUS BLD VENIPUNCTURE: CPT

## 2017-11-04 PROCEDURE — 96361 HYDRATE IV INFUSION ADD-ON: CPT

## 2017-11-04 PROCEDURE — G8978 MOBILITY CURRENT STATUS: HCPCS

## 2017-11-04 PROCEDURE — G0378 HOSPITAL OBSERVATION PER HR: HCPCS

## 2017-11-04 PROCEDURE — G8979 MOBILITY GOAL STATUS: HCPCS

## 2017-11-04 PROCEDURE — 93005 ELECTROCARDIOGRAM TRACING: CPT

## 2017-11-04 PROCEDURE — 80048 BASIC METABOLIC PNL TOTAL CA: CPT

## 2017-11-04 RX ORDER — INSULIN GLARGINE 100 [IU]/ML
34 INJECTION, SOLUTION SUBCUTANEOUS NIGHTLY
Status: DISCONTINUED | OUTPATIENT
Start: 2017-11-04 | End: 2017-11-04 | Stop reason: CLARIF

## 2017-11-04 RX ORDER — ASPIRIN 325 MG
325 TABLET ORAL DAILY
Status: CANCELLED | OUTPATIENT
Start: 2017-11-04

## 2017-11-04 RX ORDER — OXYBUTYNIN CHLORIDE 10 MG/1
10 TABLET, EXTENDED RELEASE ORAL NIGHTLY
Status: CANCELLED | OUTPATIENT
Start: 2017-11-04

## 2017-11-04 RX ORDER — POTASSIUM CHLORIDE 20MEQ/15ML
40 LIQUID (ML) ORAL PRN
Status: DISCONTINUED | OUTPATIENT
Start: 2017-11-04 | End: 2017-11-04 | Stop reason: HOSPADM

## 2017-11-04 RX ORDER — ACETAMINOPHEN 325 MG/1
650 TABLET ORAL EVERY 4 HOURS PRN
Status: DISCONTINUED | OUTPATIENT
Start: 2017-11-04 | End: 2017-11-04 | Stop reason: HOSPADM

## 2017-11-04 RX ORDER — POTASSIUM CHLORIDE 20 MEQ/1
40 TABLET, EXTENDED RELEASE ORAL PRN
Status: DISCONTINUED | OUTPATIENT
Start: 2017-11-04 | End: 2017-11-04 | Stop reason: HOSPADM

## 2017-11-04 RX ORDER — POTASSIUM CHLORIDE 7.45 MG/ML
10 INJECTION INTRAVENOUS PRN
Status: DISCONTINUED | OUTPATIENT
Start: 2017-11-04 | End: 2017-11-04 | Stop reason: HOSPADM

## 2017-11-04 RX ORDER — NICOTINE POLACRILEX 4 MG
15 LOZENGE BUCCAL PRN
Status: DISCONTINUED | OUTPATIENT
Start: 2017-11-04 | End: 2017-11-04 | Stop reason: HOSPADM

## 2017-11-04 RX ORDER — SODIUM CHLORIDE 0.9 % (FLUSH) 0.9 %
10 SYRINGE (ML) INJECTION PRN
Status: DISCONTINUED | OUTPATIENT
Start: 2017-11-04 | End: 2017-11-04 | Stop reason: HOSPADM

## 2017-11-04 RX ORDER — INSULIN GLARGINE 100 [IU]/ML
30 INJECTION, SOLUTION SUBCUTANEOUS DAILY
Status: CANCELLED | OUTPATIENT
Start: 2017-11-04

## 2017-11-04 RX ORDER — SOTALOL HYDROCHLORIDE 80 MG/1
80 TABLET ORAL DAILY
Status: CANCELLED | OUTPATIENT
Start: 2017-11-04

## 2017-11-04 RX ORDER — DEXTROSE MONOHYDRATE 50 MG/ML
100 INJECTION, SOLUTION INTRAVENOUS PRN
Status: DISCONTINUED | OUTPATIENT
Start: 2017-11-04 | End: 2017-11-04 | Stop reason: HOSPADM

## 2017-11-04 RX ORDER — FUROSEMIDE 20 MG/1
20 TABLET ORAL DAILY
Qty: 60 TABLET | Refills: 3 | Status: SHIPPED | OUTPATIENT
Start: 2017-11-07

## 2017-11-04 RX ORDER — SODIUM CHLORIDE 9 MG/ML
INJECTION, SOLUTION INTRAVENOUS CONTINUOUS
Status: DISCONTINUED | OUTPATIENT
Start: 2017-11-04 | End: 2017-11-04 | Stop reason: HOSPADM

## 2017-11-04 RX ORDER — DEXTROSE MONOHYDRATE 25 G/50ML
12.5 INJECTION, SOLUTION INTRAVENOUS PRN
Status: DISCONTINUED | OUTPATIENT
Start: 2017-11-04 | End: 2017-11-04 | Stop reason: HOSPADM

## 2017-11-04 RX ORDER — ESCITALOPRAM OXALATE 10 MG/1
20 TABLET ORAL DAILY
Status: CANCELLED | OUTPATIENT
Start: 2017-11-04

## 2017-11-04 RX ORDER — ONDANSETRON 2 MG/ML
4 INJECTION INTRAMUSCULAR; INTRAVENOUS EVERY 6 HOURS PRN
Status: DISCONTINUED | OUTPATIENT
Start: 2017-11-04 | End: 2017-11-04 | Stop reason: HOSPADM

## 2017-11-04 RX ORDER — SODIUM CHLORIDE 0.9 % (FLUSH) 0.9 %
10 SYRINGE (ML) INJECTION EVERY 12 HOURS SCHEDULED
Status: DISCONTINUED | OUTPATIENT
Start: 2017-11-04 | End: 2017-11-04 | Stop reason: HOSPADM

## 2017-11-04 RX ORDER — ALENDRONATE SODIUM 70 MG/1
70 TABLET ORAL
Status: CANCELLED | OUTPATIENT
Start: 2017-11-04

## 2017-11-04 RX ADMIN — SODIUM CHLORIDE: 9 INJECTION, SOLUTION INTRAVENOUS at 01:09

## 2017-11-04 RX ADMIN — POTASSIUM CHLORIDE 40 MEQ: 20 TABLET, EXTENDED RELEASE ORAL at 14:55

## 2017-11-04 RX ADMIN — MAGNESIUM HYDROXIDE 30 ML: 400 SUSPENSION ORAL at 08:54

## 2017-11-04 NOTE — PROGRESS NOTES
Patient discharged home with education on medications, follow up visits, and disease process management. Patient's blood sugar was rechecked per family request before leaving. Patient's blood sugar remained elevated. Educated family and patient that long acting insulin requires more time to decrease blood sugar and to recheck in an hour and if still elevated to contact doctor on call to see if she needs to take more insulin. Patient was observation status and did not want to stay any longer than needed.

## 2017-11-04 NOTE — H&P
[facility] [place]    HISTORY AND PHYSICAL EXAMINATION            Date:   11/4/2017  Patient name:  Danae Gardner  Date of admission:  11/3/2017  6:17 PM  MRN:   [id number]  YOB: 1927    CHIEF COMPLAINT     History Obtained From:  Patient and chart review. Hyperglycemia       HISTORY OF PRESENT ILLNESS      The patient is a 80 y.o.  female who is admitted to the hospital for   Chief Complaint   Patient presents with    Hyperglycemia       post colon prep   dehdration with bridgette   has fluid bolus    No abd pain   no fever'     PAST MEDICAL HISTORY       has a past medical history of Anxiety; At high risk for falls; Atrial fibrillation ([address] Caffeine use; Cancer ([address] Cervical cancer ([address] CHF (congestive heart failure) ([address] Chronic back pain; Closed fracture of cervical vertebra without mention of spinal cord injury; Constipation; DDD (degenerative disc disease); Depression; DJD (degenerative joint disease); GERD (gastroesophageal reflux disease); Hearing loss; Hematuria; History of blood transfusion; Hypertension; Multiple falls; Neuropathy in diabetes ([address] OA (osteoarthritis); Osteoarthritis; Pulmonary hypertension; Type II or unspecified type diabetes mellitus without mention of complication, not stated as uncontrolled; Urinary incontinence; Uterine cancer ([address] UTI (lower urinary tract infection) - Enterococcus ; Wears hearing aid in both ears; and Wears partial dentures. PAST SURGICAL HISTORY       has a past surgical history that includes Hysterectomy; joint replacement (Bilateral); Appendectomy; Endoscopy, colon, diagnostic; Colonoscopy; lymph node dissection; Carotid endarterectomy (Left, 10/09/14); Cystocopy; fracture surgery; eye surgery (Bilateral); Diagnostic Cardiac Cath Lab Procedure; Fixation Kyphoplasty (10/06/2015); pacemaker placement (01/27/2009); Cardiac valve replacement (2004);  Cataract removal with implant (Bilateral); egd transoral biopsy single/multiple (N/A, 11/1/2017); and colon ca scrn not hi rsk ind (N/A, 11/1/2017). HOME MEDICATIONS        Prior to Admission medications    Medication Sig Start Date End Date Taking? Authorizing Provider   Insulin Aspart (NOVOLOG FLEXPEN SC) Inject 10 Units into the skin Daily with supper    Historical Provider, MD   bisacodyl (DULCOLAX) 5 MG EC tablet Take 2 tablets by mouth daily as needed for Constipation Patient is to purchase Dulcolax tablets over the counter if not covered by [employer]. Take four Dulcolax tablets as directed. Please follow further instructions as listed on your Colonoscopy Preparation sheet. 9/6/17   Maria De Jesus Maurer MD   trospium (SANCTURA) 20 MG tablet Take 1 tablet by mouth 2 times daily  Patient taking differently: Take 20 mg by mouth daily  8/23/17   Adriane Nichole MD   ACCU-CHEK SMARTVIEW strip  7/31/16   Historical Provider, MD MCKEON ALCOHOL SWABS 70 % PADS use as directed four times a day 6/30/16   Historical Provider, MD SPIVEY PENTIPS 31G X 5 MM MISC  9/15/15   Historical Provider, MD   insulin glargine (LANTUS) 100 UNIT/ML injection vial Inject 34 Units into the skin Daily. Patient taking differently: Inject 30 Units into the skin Daily  1/13/15   Matias Potter DO   aspirin 325 MG tablet Take 1 tablet by mouth daily. 9/29/14   Ruddy Peters, DO   Insulin Aspart (NOVOLOG FLEXPEN SC) Inject 8 Units into the skin daily (with breakfast)     Historical Provider, MD   alendronate (FOSAMAX) 70 MG tablet Take 70 mg by mouth every 7 days Friday 10/6/13   Historical Provider, MD   furosemide (LASIX) 20 MG tablet Take 20 mg by mouth daily  7/31/13   Historical Provider, MD   escitalopram (LEXAPRO) 10 MG tablet Take 20 mg by mouth daily. 8/27/13   Historical Provider, MD   sotalol (BETAPACE) 80 MG tablet Take 80 mg by mouth daily  10/1/13   Historical Provider, MD       ALLERGIES      Sulfa antibiotics; Codeine;  Lidocaine; Meperidine; Penicillins; and Terramycin [oxytetracycline]    SOCIAL HISTORY       reports that she has never smoked. She has never used smokeless tobacco. She reports that she does not drink alcohol or use drugs. FAMILY HISTORY      family history includes Cancer in her brother, mother, and sister; Other in her father. REVIEW OF SYSTEMS      · Constitutional: Negative for weight loss  · Eyes: Negative for visual changes, diplopia, scleral icterus. · ENT: Negative for Headaches, hearing loss, vertigo, mouth sores, sore throat. · Cardiovascular: Negative for lightheadedness/orthostatic symptoms ,chest pain, dyspnea on exertion, palpitations or loss of consciousness. · Respiratory: Negative for cough or wheezing, sputum production, hemoptysis, pleuritic pain. · Gastrointestinal: Negative for nausea/vomiting, change in bowel habits, abdominal pain, dysphagia/appetite loss, hematemesis, blood in stools. · Genitourinary:Negative for change in bladder habits, dysuria, trouble voiding, hematuria. · Musculoskeletal: Negative for gait disturbance, weakness, joint complaints. · Integumentary: Negative for rash, pruritis. · Neurological: Negative for headache, dizziness, change in muscle strength, numbness/tingling, change in gait, balance, coordination,   · Psychiatric: negative for change in mood, affect, memory, mentation, behavior. · Endocrine: negative for temperature intolerance, excessive thirst, fluid intake, or urination, tremor. · Hematologic/Lymphatic: negative for abnormal bruising or bleeding, blood clots, swollen lymph nodes. · Allergic/Immunologic: negative for nasal congestion, pruritis, hives. PHYSICAL EXAM      BP (!) 150/72   Pulse 69   Temp 97.5 °F (36.4 °C) (Oral)   Resp 20   Ht 5' 7\" (1.702 m)   Wt 162 lb 4.1 oz (73.6 kg)   SpO2 100%   BMI 25.41 kg/m²      · General appearance: well nourished  · HEENT: Head: Normocephalic, no lesions, without obvious abnormality.   · Lungs: clear to auscultation bilaterally  · Heart: regular rate and rhythm, S1, S2 normal, no murmur, click, rub or gallop  · Abdomen: soft, non-tender; bowel sounds normal; no masses,  no organomegaly  · Extremities: extremities normal, atraumatic, no cyanosis or edema  · Neurological: Gait normal. Reflexes normal and symmetric. Sensation grossly normal  · Skin - no rash, no lump   · Eye no icterus no redness  · Psych-normal affect   · NEURO-no limb weakness  No facial droop  · Lymphatic system-no lymphadenopathy no splenomegaly     DIAGNOSTICS      Laboratory Testing:  CBC:   Recent Labs      11/03/17 1907   WBC  9.8   HGB  11.7*   PLT  223     BMP:    Recent Labs      11/03/17 1907 11/04/17   0741   NA  130*   --   136   K  4.1   --   3.4*   CL  91*   --   103   CO2  29   --   22   BUN  42*   --   29*   CREATININE  1.17*   --   0.85   GLUCOSE  432*   < >  123*    < > = values in this interval not displayed. S. Calcium:  Recent Labs      11/04/17   0741   CALCIUM  9.0     S. Ionized Calcium:No results for input(s): IONCA in the last 72 hours. S. Magnesium:No results for input(s): MG in the last 72 hours. S. Phosphorus:No results for input(s): PHOS in the last 72 hours. S. Glucose:  Recent Labs      11/04/17   0037  11/04/17   0821  11/04/17   1121   POCGLU  207*  118*  312*     Glycosylated hemoglobin A1C: No results for input(s): LABA1C in the last 72 hours. INR: No results for input(s): INR in the last 72 hours. Hepatic functions: No results for input(s): ALKPHOS, ALT, AST, PROT, BILITOT, BILIDIR, LABALBU in the last 72 hours. Pancreatic functions:No results for input(s): LACTA, AMYLASE in the last 72 hours. S. Lactic Acid: No results for input(s): LACTA in the last 72 hours. Cardiac enzymes:No results for input(s): CKTOTAL, CKMB, CKMBINDEX, TROPONINI in the last 72 hours. BNP:No results for input(s): BNP in the last 72 hours.   Lipid profile: No results for input(s): CHOL, TRIG, HDL, LDLCALC in the last 72 hours.     Invalid input(s): LDL  Blood Gases: No results found for: PH, PCO2, PO2, HCO3, O2SAT  Thyroid functions:   Lab Results   Component Value Date    TSH 1.22 09/14/2017        Imaging/Diagonstics:       CXR:       ASSESSMENT     Patient Active Problem List   Diagnosis    Lumbar spinal stenosis    Spondylolisthesis of lumbar region    Pubic ramus fracture    Closed fracture of lumbar vertebra ([address]    Thoracic compression fracture    DM (diabetes mellitus), type 2, uncontrolled (HCC)    Closed fracture of shaft of metacarpal bone    Osteoarthritis    Anxiety    Depression    Hearing loss    Neuropathy in diabetes (HCC)    Elevated troponin    Troponin level elevated    Generalized weakness    Pulmonary hypertension    HTN (hypertension)    Carotid stenosis    Type II or unspecified type diabetes mellitus without mention of complication, not stated as uncontrolled    Uterine cancer ([address]    DDD (degenerative disc disease)    H/O mitral valve repair    History of CHF (congestive heart failure)    Multiple falls    Hyperglycemia    Uncontrolled hypertension    Compression fracture of L3 lumbar vertebra (HCC)    Compression fracture of thoracic vertebra (HCC)    Falling episodes    Gait apraxia    Lumbar spondylosis    Lower urinary tract infectious disease    Acquired spondylolisthesis    Spinal stenosis, lumbar region, without neurogenic claudication    Degeneration of lumbar or lumbosacral intervertebral disc    Acute kidney injury ([address]    Altered mental status    Hypoglycemia    Hyponatremia    Opiate overdose    Acquired cyst of kidney    Microscopic hematuria    Urge incontinence    Urinary frequency    Anemia    Constipation    Elevated LFTs          hyperglycemia with dehration from colon prep      bs 123   cr nl bp ok  PLAN         had ivf   bs controlled   cr nk    d/c       MD LALA Lorenzo[address]

## 2017-11-04 NOTE — PROGRESS NOTES
Physical Therapy    Facility/Department: [facility] PROGRESSIVE CARE  Initial Assessment    NAME: Ct Huddleston  : 1927  MRN: [id number]    Date of Service: 2017    Patient Diagnosis(es): The primary encounter diagnosis was Hyperglycemia. A diagnosis of Dehydration was also pertinent to this visit. has a past medical history of Anxiety; At high risk for falls; Atrial fibrillation ([address] Caffeine use; Cancer ([address] Cervical cancer ([address] CHF (congestive heart failure) ([address] Chronic back pain; Closed fracture of cervical vertebra without mention of spinal cord injury; Constipation; DDD (degenerative disc disease); Depression; DJD (degenerative joint disease); GERD (gastroesophageal reflux disease); Hearing loss; Hematuria; History of blood transfusion; Hypertension; Multiple falls; Neuropathy in diabetes ([address] OA (osteoarthritis); Osteoarthritis; Pulmonary hypertension; Type II or unspecified type diabetes mellitus without mention of complication, not stated as uncontrolled; Urinary incontinence; Uterine cancer ([address] UTI (lower urinary tract infection) - Enterococcus ; Wears hearing aid in both ears; and Wears partial dentures. has a past surgical history that includes Hysterectomy; joint replacement (Bilateral); Appendectomy; Endoscopy, colon, diagnostic; Colonoscopy; lymph node dissection; Carotid endarterectomy (Left, 10/09/14); Cystocopy; fracture surgery; eye surgery (Bilateral); Diagnostic Cardiac Cath Lab Procedure; Fixation Kyphoplasty (10/06/2015); pacemaker placement (2009); Cardiac valve replacement (); Cataract removal with implant (Bilateral); egd transoral biopsy single/multiple (N/A, 2017); and colon ca scrn not hi rsk ind (N/A, 2017).     Vision/Hearing  Vision: Impaired  Vision Exceptions: Wears glasses for reading  Hearing: Exceptions to [facility]  Hearing Exceptions: Bilateral hearing aid     Subjective  Pain Screening  Patient Currently in Pain: Denies  Vital Signs  Patient Currently in Pain: Denies     Orientation  Orientation  Overall Orientation Status: Within Functional Limits    Social/Functional History  Social/Functional History  Lives With: Alone  Type of Home: House  Home Layout: One level  Home Access: Level entry  Bathroom Shower/Tub: Tub/Shower unit, Doors  Bathroom Toilet: Standard  Home Equipment: Cane, Rolling walker  Receives Help From: Family  ADL Assistance: Independent  Homemaking Assistance: Needs assistance (daughter helps)  Homemaking Responsibilities: No  Ambulation Assistance: Independent  Transfer Assistance: Independent  Active : Yes  Mode of Transportation: Car  Objective    AROM RLE (degrees)  RLE AROM: WFL  AROM LLE (degrees)  LLE AROM : WFL  AROM RUE (degrees)  RUE AROM : WFL  AROM LUE (degrees)  LUE AROM : WFL     Bed mobility  Supine to Sit: Modified independent  Sit to Supine: Modified independent  Scooting: Modified independent  Comment: patient used bed rail to get in and out of bed     Transfers  Sit to Stand: Contact guard assistance  Stand to sit: Contact guard assistance  Lateral Transfers: Contact guard assistance  Comment: patient unsteady with standing  required MIN A x1 for sit to stand from toilet.  Required VC's to not fall into bed when returning to bed     Ambulation  Ambulation?: Yes  Ambulation 1  Surface: level tile  Device: Single point cane  Assistance: Contact guard assistance  Quality of Gait: unsteady   Distance: 8 steps to bathroom and back to bed  Stairs/Curb  Stairs?: No     Balance  Sitting - Static: Good  Sitting - Dynamic: Good  Standing - Static: Fair  Standing - Dynamic: Fair      Assessment   Body structures, Functions, Activity limitations: Decreased functional mobility   Treatment Diagnosis: dec function  Prognosis: Good  Decision Making: Low Complexity  REQUIRES PT FOLLOW UP: Yes  Activity Tolerance  Activity Tolerance: Patient Tolerated treatment well;Patient limited by fatigue     Discharge

## 2017-11-04 NOTE — PROGRESS NOTES
Patient arrived to pcu around 0000, admission and assessment complete. Patient oriented to room and call light.  No new questions at this time

## 2017-11-04 NOTE — CARE COORDINATION
 Troponin level elevated    Generalized weakness    Pulmonary hypertension    HTN (hypertension)    Carotid stenosis    Type II or unspecified type diabetes mellitus without mention of complication, not stated as uncontrolled    [facility]terine cancer ([facility] [address]    DDD (degenerative disc disease)    H/O mitral valve repair    History of CHF (congestive heart failure)    Multiple falls    Hyperglycemia    Uncontrolled hypertension    Compression fracture of L3 lumbar vertebra (HCC)    Compression fracture of thoracic vertebra (HCC)    Falling episodes    Gait apraxia    Lumbar spondylosis    Lower urinary tract infectious disease    Acquired spondylolisthesis    Spinal stenosis, lumbar region, without neurogenic claudication    Degeneration of lumbar or lumbosacral intervertebral disc    Acute kidney injury ([facility] [address]    Altered mental status    Hypoglycemia    Hyponatremia    Opiate overdose    Acquired cyst of kidney    Microscopic hematuria    Urge incontinence    Urinary frequency    Anemia    Constipation    Elevated LFTs       Isolation/Infection:   Isolation          No Isolation            Nurse Assessment:  Last Vital Signs: BP (!) 150/72   Pulse 69   Temp 97.5 °F (36.4 °C) (Oral)   Resp 20   Ht 5' 7\" (1.702 m)   Wt 162 lb 4.1 oz (73.6 kg)   SpO2 100%   BMI 25.41 kg/m²     Last documented pain score (0-10 scale):    Last Weight:   Wt Readings from Last 1 Encounters:   11/03/17 162 lb 4.1 oz (73.6 kg)     Mental Status:  oriented, alert, thought processes intact and able to concentrate and follow conversation - forgetful at times    IV Access:  - None    Nursing Mobility/ADLs:  Walking   Assisted  Transfer  Assisted  Bathing  Assisted  Dressing  Assisted  Toileting  Assisted  Feeding  Independent  Med Admin  Assisted  Med Delivery   whole    Wound Care Documentation and Therapy:        Elimination:  Continence:   · Bowel:  Yes  · Bladder: Yes - Incontinent at times  Urinary Catheter: None   Colostomy/Ileostomy/Ileal Conduit: No    Date of Last BM: 11/4/2017    Intake/Output Summary (Last 24 hours) at 11/04/17 1359  Last data filed at 11/04/17 1254   Gross per 24 hour   Intake              740 ml   Output              300 ml   Net              440 ml     I/O last 3 completed shifts: In: 500 [P.O.:200; I.V.:300]  Out: -     Safety Concerns: At Risk for Falls    Impairments/Disabilities:      Hearing    Nutrition Therapy:  Current Nutrition Therapy:   - Oral Diet:  Carb Control 4 carbs/meal (1800kcals/day)    Routes of Feeding: Oral  Liquids: No Restrictions  Daily Fluid Restriction: no  Last Modified Barium Swallow with Video (Video Swallowing Test): not done    Treatments at the Time of Hospital Discharge:   Respiratory Treatments: No  Oxygen Therapy:  is not on home oxygen therapy. Ventilator:    - No ventilator support    Rehab Therapies: Physical Therapy and Occupational Therapy  Weight Bearing Status/Restrictions: No weight bearing restirctions  Other Medical Equipment (for information only, NOT a DME order):  cane and walker  Other Treatments: Skilled Nursing Assessment, Medication Education and Monitoring    Patient's personal belongings (please select all that are sent with patient):  Hearing Aides bilateral    RN SIGNATURE:  Electronically signed by Sharlene Kern RN on 11/4/17 at 4:05 PM    PHYSICIAN SECTION    Prognosis: Fair    Condition at Discharge: Stable    Rehab Potential (if transferring to Rehab): Fair    Recommended Labs or Other Treatments After Discharge: N/A    Physician Certification: I certify the above information and transfer of Jaime Sigala  is necessary for the continuing treatment of the diagnosis listed and that she requires Home Care for greater 30 days.      Update Admission H&P: No change in H&P    PHYSICIAN SIGNATURE:  Electronically signed by Naeem Hurley MD on 11/4/17 at 1:59 PM    CASE MANAGEMENT/SOCIAL WORK SECTION    Inpatient Status Date: inpt    [facility] Readmission Risk Assessment Score:  Risk Score: 9.5   (Score > 14= high risk for readmission)    Discharging to Facility/ [address] [address]  Phone [phone]  Fax  [phone]    / signature: Electronically signed by Latesha Wan RN on 11/4/17 at 4:05 PM              CONTINUE these medications which have CHANGED or have new prescriptions     Medication Dose   furosemide (LASIX) 20 MG tablet 20 mg   Take 1 tablet by mouth daily   Quantity: 60 tablet Refills: 3           CONTINUE these medications which have NOT CHANGED     Medication Dose   !! Insulin Aspart (NOVOLOG FLEXPEN SC) 10 Units   Inject 10 Units into the skin Daily with supper       bisacodyl (DULCOLAX) 5 MG EC tablet 10 mg   Take 2 tablets by mouth daily as needed for Constipation Patient is to purchase Dulcolax tablets over the counter if not covered by [employer]. Take four Dulcolax tablets as directed. Please follow further instructions as listed on your Colonoscopy Preparation sheet. Quantity: 30 tablet Refills: 3       trospium (SANCTURA) 20 MG tablet 20 mg   Take 1 tablet by mouth 2 times daily   Quantity: 60 tablet Refills: 11       ACCU-CHEK SMARTVIEW strip       Refills: 0        RA ALCOHOL SWABS 70 % PADS    use as directed four times a day   Refills: 0        UNIFINE PENTIPS 31G X 5 MM MISC           insulin glargine (LANTUS) 100 UNIT/ML injection vial 34 Units   Inject 34 Units into the skin Daily. Quantity: 1 vial Refills: 3       aspirin 325 MG tablet 325 mg   Take 1 tablet by mouth daily. Quantity: 30 tablet Refills: 3       !! Insulin Aspart (NOVOLOG FLEXPEN SC) 8 Units   Inject 8 Units into the skin daily (with breakfast)        alendronate (FOSAMAX) 70 MG tablet 70 mg   Take 70 mg by mouth every 7 days Friday       escitalopram (LEXAPRO) 10 MG tablet 20 mg   Take 20 mg by mouth daily.        sotalol (BETAPACE) 80 MG tablet

## 2017-11-04 NOTE — PROGRESS NOTES
RN spoke with Nikita Burnette regarding new patient admission. He did not want to restart home meds right now. Labs ordered for am, medium sliding scale ordered. NS 50ml/hr also ordered.  Physician aware that patient has history of CHF

## 2017-11-04 NOTE — DISCHARGE INSTR - DIET

## 2017-11-06 LAB
EKG ATRIAL RATE: 122 BPM
EKG ATRIAL RATE: 63 BPM
EKG ATRIAL RATE: 65 BPM
EKG P AXIS: 48 DEGREES
EKG P AXIS: 84 DEGREES
EKG P-R INTERVAL: 176 MS
EKG P-R INTERVAL: 198 MS
EKG P-R INTERVAL: 200 MS
EKG Q-T INTERVAL: 296 MS
EKG Q-T INTERVAL: 428 MS
EKG Q-T INTERVAL: 462 MS
EKG QRS DURATION: 88 MS
EKG QRS DURATION: 88 MS
EKG QRS DURATION: 90 MS
EKG QTC CALCULATION (BAZETT): 398 MS
EKG QTC CALCULATION (BAZETT): 437 MS
EKG QTC CALCULATION (BAZETT): 480 MS
EKG R AXIS: 45 DEGREES
EKG R AXIS: 46 DEGREES
EKG R AXIS: 53 DEGREES
EKG T AXIS: -81 DEGREES
EKG T AXIS: 0 DEGREES
EKG T AXIS: 18 DEGREES
EKG VENTRICULAR RATE: 109 BPM
EKG VENTRICULAR RATE: 63 BPM
EKG VENTRICULAR RATE: 65 BPM
SURGICAL PATHOLOGY REPORT: NORMAL

## 2017-11-16 ENCOUNTER — TELEPHONE (OUTPATIENT)
Dept: GASTROENTEROLOGY | Age: 82
End: 2017-11-16

## 2017-11-16 NOTE — TELEPHONE ENCOUNTER
11-16-17 TRIED CALLING PATIENT TO INFORM DUE TO CONFLICT IN PROVIDERS SCHEDULE APPT CANCELLED AND NO ANSWER THEREFORE MAILING LETTER TODAY IN REGARDS TO THIS---[facility]

## 2017-11-21 ENCOUNTER — OFFICE VISIT (OUTPATIENT)
Dept: ORTHOPEDIC SURGERY | Age: 82
End: 2017-11-21
Payer: MEDICARE

## 2017-11-21 DIAGNOSIS — M25.512 ACUTE PAIN OF LEFT SHOULDER: Primary | ICD-10-CM

## 2017-11-21 DIAGNOSIS — S42.035A CLOSED NONDISPLACED FRACTURE OF ACROMIAL END OF LEFT CLAVICLE, INITIAL ENCOUNTER: ICD-10-CM

## 2017-11-21 PROCEDURE — 99024 POSTOP FOLLOW-UP VISIT: CPT | Performed by: ORTHOPAEDIC SURGERY

## 2017-11-21 PROCEDURE — 23500 CLTX CLAVICULAR FX W/O MNPJ: CPT | Performed by: ORTHOPAEDIC SURGERY

## 2017-11-21 NOTE — PROGRESS NOTES
Subjective:      Patient ID: Lalitha Bentley is a 80 y.o. female. HPI  Please refer to previous clinic notes    Patient reports recent fall onset left shoulder pain. Patient's pain is diffuse about the left shoulder  Review of Systems    Objective:   Physical Exam  Examination reveals no obvious deformity initial exam not well at specific although follow-up exam patient is a bit more tender over the distal clavicle. Diagnostic x-rays 2 views left shoulder obtained reviewed by myself in clinic today patient with end-stage degenerative arthritis left shoulder patient with several minimally displaced distal clavicle fracture    Incision appears to be healing. Nevertheless the patient's skin has elongated and unfortunately his suture line is in fact a little bit loose  Assessment:      Encounter Diagnosis   Name Primary?     Acute pain of left shoulder Yes           Plan:      Follow-up 1 week

## 2017-12-12 ENCOUNTER — OFFICE VISIT (OUTPATIENT)
Dept: ORTHOPEDIC SURGERY | Age: 82
End: 2017-12-12

## 2017-12-12 DIAGNOSIS — S42.035D CLOSED NONDISPLACED FRACTURE OF ACROMIAL END OF LEFT CLAVICLE WITH ROUTINE HEALING, SUBSEQUENT ENCOUNTER: Primary | ICD-10-CM

## 2017-12-12 DIAGNOSIS — M25.512 ACUTE PAIN OF LEFT SHOULDER: ICD-10-CM

## 2017-12-12 PROCEDURE — 99024 POSTOP FOLLOW-UP VISIT: CPT | Performed by: ORTHOPAEDIC SURGERY

## 2017-12-12 NOTE — PROGRESS NOTES
Subjective:      Patient ID: Kori Jeffers is a 80 y.o. female. HPI   patient is here for follow-up distal clavicle fracture left. Patient reports she is making satisfactory progress with her left shoulder. Patient reports that she is slow in having a difficult time building up her strength in her legs. Review of Systems    Objective:   Physical Exam   Constitutional: She is oriented to person, place, and time. She appears well-developed and well-nourished. HENT:   Head: Normocephalic and atraumatic. Eyes: Conjunctivae and EOM are normal.   Neck: Normal range of motion. Pulmonary/Chest: Effort normal. No respiratory distress. Neurological: She is alert and oriented to person, place, and time. She has normal strength. No sensory deficit. Normal gait   Skin: Skin is warm and dry. Psychiatric: Her behavior is normal. Thought content normal.   Nursing note and vitals reviewed. AP shoulder reveals healing distal clavicle fracture left  Assessment:      Encounter Diagnoses   Name Primary?  Closed nondisplaced fracture of acromial end of left clavicle with routine healing, subsequent encounter Yes    Acute pain of left shoulder            Plan:       Fu 8 w

## 2017-12-29 ENCOUNTER — HOSPITAL ENCOUNTER (OUTPATIENT)
Age: 82
Setting detail: SPECIMEN
Discharge: HOME | End: 2017-12-29
Payer: MEDICARE

## 2017-12-29 LAB
ANION GAP SERPL CALCULATED.3IONS-SCNC: 11 MMOL/L (ref 9–17)
BILIRUBIN URINE: NEGATIVE
BUN BLDV-MCNC: 23 MG/DL (ref 8–23)
BUN/CREAT BLD: ABNORMAL (ref 9–20)
CALCIUM SERPL-MCNC: 9.6 MG/DL (ref 8.6–10.4)
CHLORIDE BLD-SCNC: 94 MMOL/L (ref 98–107)
CO2: 31 MMOL/L (ref 20–31)
COLOR: YELLOW
COMMENT UA: ABNORMAL
CREAT SERPL-MCNC: 0.89 MG/DL (ref 0.5–0.9)
GFR AFRICAN AMERICAN: >60 ML/MIN
GFR NON-AFRICAN AMERICAN: 60 ML/MIN
GFR SERPL CREATININE-BSD FRML MDRD: ABNORMAL ML/MIN/{1.73_M2}
GFR SERPL CREATININE-BSD FRML MDRD: ABNORMAL ML/MIN/{1.73_M2}
GLUCOSE BLD-MCNC: 201 MG/DL (ref 70–99)
GLUCOSE URINE: ABNORMAL
HCT VFR BLD CALC: 35.9 % (ref 36–46)
HEMOGLOBIN: 12.2 G/DL (ref 12–16)
KETONES, URINE: NEGATIVE
LEUKOCYTE ESTERASE, URINE: NEGATIVE
MCH RBC QN AUTO: 29.4 PG (ref 26–34)
MCHC RBC AUTO-ENTMCNC: 34.1 G/DL (ref 31–37)
MCV RBC AUTO: 86.3 FL (ref 80–100)
NITRITE, URINE: NEGATIVE
PDW BLD-RTO: 14.5 % (ref 11.5–14.9)
PH UA: 6 (ref 5–8)
PLATELET # BLD: 251 K/UL (ref 150–450)
PMV BLD AUTO: 8.5 FL (ref 6–12)
POTASSIUM SERPL-SCNC: 4.3 MMOL/L (ref 3.7–5.3)
PROTEIN UA: NEGATIVE
RBC # BLD: 4.16 M/UL (ref 4–5.2)
SODIUM BLD-SCNC: 136 MMOL/L (ref 135–144)
SPECIFIC GRAVITY UA: 1.02 (ref 1–1.03)
TURBIDITY: CLEAR
URINE HGB: NEGATIVE
UROBILINOGEN, URINE: NORMAL
WBC # BLD: 5.9 K/UL (ref 3.5–11)

## 2017-12-29 PROCEDURE — 81003 URINALYSIS AUTO W/O SCOPE: CPT

## 2017-12-29 PROCEDURE — 80048 BASIC METABOLIC PNL TOTAL CA: CPT

## 2017-12-29 PROCEDURE — 85027 COMPLETE CBC AUTOMATED: CPT

## 2018-01-17 ENCOUNTER — HOSPITAL ENCOUNTER (OUTPATIENT)
Dept: GENERAL RADIOLOGY | Age: 83
Discharge: HOME | End: 2018-01-17
Payer: MEDICARE

## 2018-01-17 ENCOUNTER — HOSPITAL ENCOUNTER (OUTPATIENT)
Dept: CT IMAGING | Age: 83
Discharge: HOME | End: 2018-01-17
Payer: MEDICARE

## 2018-01-17 ENCOUNTER — HOSPITAL ENCOUNTER (OUTPATIENT)
Age: 83
Discharge: HOME | End: 2018-01-17
Payer: MEDICARE

## 2018-01-17 DIAGNOSIS — R27.0 ATAXIA: ICD-10-CM

## 2018-01-17 DIAGNOSIS — M51.9 LUMBAR DISC DISEASE: ICD-10-CM

## 2018-01-17 PROCEDURE — 72100 X-RAY EXAM L-S SPINE 2/3 VWS: CPT

## 2018-01-17 PROCEDURE — 70450 CT HEAD/BRAIN W/O DYE: CPT

## 2018-01-31 ENCOUNTER — OFFICE VISIT (OUTPATIENT)
Dept: GASTROENTEROLOGY | Age: 83
End: 2018-01-31
Payer: MEDICARE

## 2018-01-31 VITALS
HEIGHT: 66 IN | SYSTOLIC BLOOD PRESSURE: 124 MMHG | HEART RATE: 59 BPM | WEIGHT: 156 LBS | DIASTOLIC BLOOD PRESSURE: 62 MMHG | OXYGEN SATURATION: 99 % | BODY MASS INDEX: 25.07 KG/M2

## 2018-01-31 DIAGNOSIS — D64.9 ANEMIA, UNSPECIFIED TYPE: ICD-10-CM

## 2018-01-31 DIAGNOSIS — K59.00 CONSTIPATION, UNSPECIFIED CONSTIPATION TYPE: Primary | ICD-10-CM

## 2018-01-31 DIAGNOSIS — R79.89 ELEVATED LFTS: ICD-10-CM

## 2018-01-31 PROCEDURE — G8599 NO ASA/ANTIPLAT THER USE RNG: HCPCS | Performed by: INTERNAL MEDICINE

## 2018-01-31 PROCEDURE — 1123F ACP DISCUSS/DSCN MKR DOCD: CPT | Performed by: INTERNAL MEDICINE

## 2018-01-31 PROCEDURE — G8419 CALC BMI OUT NRM PARAM NOF/U: HCPCS | Performed by: INTERNAL MEDICINE

## 2018-01-31 PROCEDURE — 1036F TOBACCO NON-USER: CPT | Performed by: INTERNAL MEDICINE

## 2018-01-31 PROCEDURE — 1090F PRES/ABSN URINE INCON ASSESS: CPT | Performed by: INTERNAL MEDICINE

## 2018-01-31 PROCEDURE — G8427 DOCREV CUR MEDS BY ELIG CLIN: HCPCS | Performed by: INTERNAL MEDICINE

## 2018-01-31 PROCEDURE — G8484 FLU IMMUNIZE NO ADMIN: HCPCS | Performed by: INTERNAL MEDICINE

## 2018-01-31 PROCEDURE — 4040F PNEUMOC VAC/ADMIN/RCVD: CPT | Performed by: INTERNAL MEDICINE

## 2018-01-31 PROCEDURE — 99214 OFFICE O/P EST MOD 30 MIN: CPT | Performed by: INTERNAL MEDICINE

## 2018-01-31 RX ORDER — RANITIDINE 150 MG/1
150 TABLET ORAL NIGHTLY
Qty: 60 TABLET | Refills: 3 | Status: SHIPPED | OUTPATIENT
Start: 2018-01-31

## 2018-01-31 ASSESSMENT — ENCOUNTER SYMPTOMS
RECTAL PAIN: 0
ANAL BLEEDING: 0
BACK PAIN: 1
NAUSEA: 0
SINUS PRESSURE: 0
VOMITING: 0
EYES NEGATIVE: 1
SORE THROAT: 0
TROUBLE SWALLOWING: 0
RESPIRATORY NEGATIVE: 1
SHORTNESS OF BREATH: 0
WHEEZING: 0
COUGH: 0
ABDOMINAL DISTENTION: 0
ABDOMINAL PAIN: 0
DIARRHEA: 0
BLOOD IN STOOL: 0
CONSTIPATION: 1

## 2018-01-31 NOTE — PROGRESS NOTES
Subjective:      Patient ID: Jorge Obregon is a 80 y.o. female. HPI      Dr. Colonel Javi MD our mutual patient Jorge Obregon was seen  for   1. Constipation, unspecified constipation type    2. Anemia, unspecified type    3. Elevated LFTs     . Patient is here for follow up, Constipation (she has not been taking her dulcolax for her symptoms, she states she has only been going every couple of days ) and Anemia          Here for f/u   Patient is here for follow-up last visit was seen for constipation and anemia and elevated liver enzyme  CT of the abdomen was ordered also and that showed 2 cystic lesion measuring 1.1 cm and 8 mm in the body tail region of the pancreas, but there favoring benign cyst   lfts normalized on th last testing  in 9/2017   Cbc on 12/29 normal , hgb 12.2  Been having multiple falls was told by neurology because of no feeling in her legs ( ?? Neuropathy)  No syncope only balance problem   Egd/colon done as below   Constipation , some time 2-3 days before she has a bm       Lab Results   Component Value Date    WBC 5.9 12/29/2017    HGB 12.2 12/29/2017    HCT 35.9 (L) 12/29/2017    MCV 86.3 12/29/2017     12/29/2017       Past Medical, Family, and Social History reviewed and does contribute to the patient presenting condition. patient\"s PMH/PSH,SH,PSYCH hx, MEDs, ALLERGIES, and ROS was all reviewed and updated ion the appropriate sections    PROCEDURE NOTE     DATE OF PROCEDURE: 11/1/2017     SURGEON: Jolinda Soulier, MD  Facility : [facility]  ASSISTANT: None  Anesthesia: MAC  PREOPERATIVE DIAGNOSIS:   abd pain    anemia      POSTOPERATIVE DIAGNOSIS: as described below     OPERATION: Total colonoscopy      ANESTHESIA: Moderate Sedation     ESTIMATED BLOOD LOSS: less than 50      COMPLICATIONS: None.      SPECIMENS:  Was Not Obtained     HISTORY: The patient is a 80y.o. year old female with history of above preop diagnosis.   I recommended colonoscopy with possible biopsy or polypectomy and I explained the risk, benefits, expected outcome, and alternatives to the procedure. Risks included but are not limited to bleeding, infection, respiratory distress, hypotension, and perforation of the colon and possibility of missing a lesion. The patient understands and is in agreement. PROCEDURE: The patient was given IV conscious sedation. The patient's SPO2 remained above 90% throughout the procedure.      The colonoscope was inserted per rectum and advanced under direct vision to the cecum without difficulty.       The prep was poor.       Findings:  Terminal ileum: normal     Cecum/Ascending colon: normal     Transverse colon: abnormal: diverticulosis      Descending/Sigmoid colon: abnormal: diverticulosis      Rectum/Anus: examined in normal and retroflexed positions and was normal     Withdrawal Time was (minutes): 10     The colon was decompressed and the scope was removed. The patient tolerated the procedure well.      Recommendations/Plan:   1. Lifestyle and dietary modifications as discussed  2. F/U Biopsies  3. F/U In OfficeYes  4. Discussed with the family        Electronically signed by Kelsy Silva MD  on 11/1/2017 at 12:19 PM     PROCEDURE NOTE     DATE OF PROCEDURE: 11/1/2017      SURGEON: Kelsy Silva MD  Facility: [facility]  ASSISTANT: None  Anesthesia: MAC  PREOPERATIVE DIAGNOSIS:   abd pain    anemia   Diagnosis:  Gastritis, mild , bxs taken      POSTOPERATIVE DIAGNOSIS: As described below     OPERATION: Upper GI endoscopy with Biopsy     ANESTHESIA: Moderate Sedation      ESTIMATED BLOOD LOSS: Less than 50 ml     COMPLICATIONS: None.      SPECIMENS:  Was Obtained:   Gastritis, mild , bxs taken      HISTORY: The patient is a 80y.o. year old female with history of above preop diagnosis. I recommended esophagogastroduodenoscopy with possible biopsy and I explained the risk, benefits, expected outcome, and alternatives to the procedure.   Risks Cardiovascular: Negative for chest pain, palpitations and leg swelling. Gastrointestinal: Positive for constipation. Negative for abdominal distention, abdominal pain, anal bleeding, blood in stool, diarrhea, nausea, rectal pain and vomiting. Endocrine: Negative. Genitourinary: Negative. Musculoskeletal: Positive for arthralgias, back pain, gait problem and myalgias. Skin: Negative. Allergic/Immunologic: Positive for environmental allergies. Neurological: Positive for weakness. Negative for dizziness, tremors, light-headedness, numbness and headaches. Hematological: Bruises/bleeds easily. Psychiatric/Behavioral: Negative. Negative for sleep disturbance. The patient is not nervous/anxious. Objective:   Physical Exam   Constitutional: She is oriented to person, place, and time. She appears well-developed and well-nourished. No distress. HENT:   Head: Normocephalic. Mouth/Throat: No oropharyngeal exudate. Eyes: Pupils are equal, round, and reactive to light. No scleral icterus. Neck: Normal range of motion. Neck supple. No JVD present. No tracheal deviation present. No thyromegaly present. Cardiovascular: Normal rate, regular rhythm, normal heart sounds and intact distal pulses. No murmur heard. Pulmonary/Chest: Effort normal and breath sounds normal. No respiratory distress. She has no wheezes. Abdominal: Soft. Bowel sounds are normal. She exhibits no distension. There is no tenderness. There is no rebound and no guarding. No ascites   Musculoskeletal: She exhibits no edema. wc ,    Neurological: She is alert and oriented to person, place, and time. In [address], not fully evaluated strength    Skin: Skin is warm. No rash noted. She is not diaphoretic. No erythema. No pallor. She is not diaphoretic   Psychiatric: She has a normal mood and affect. Her behavior is normal. Judgment and thought content normal.   Nursing note and vitals reviewed. Assessment:      1.

## 2018-02-06 ENCOUNTER — OFFICE VISIT (OUTPATIENT)
Dept: ORTHOPEDIC SURGERY | Age: 83
End: 2018-02-06

## 2018-02-06 DIAGNOSIS — S42.035D CLOSED NONDISPLACED FRACTURE OF ACROMIAL END OF LEFT CLAVICLE WITH ROUTINE HEALING, SUBSEQUENT ENCOUNTER: Primary | ICD-10-CM

## 2018-02-06 PROCEDURE — 99024 POSTOP FOLLOW-UP VISIT: CPT | Performed by: ORTHOPAEDIC SURGERY

## 2018-02-06 NOTE — PROGRESS NOTES
Subjective:      Patient ID: Simeon Bradshaw is a 80 y.o. female. Refer to all my previous clinic notes. Patient is here follow-up left distal clavicle fracture. Patient without complaints of pain in that region of any significance        Review of Systems    Objective:   Physical Exam  Examination reveals minimal tenderness over left acromioclavicular joint    Diagnostic x-rays AP clavicle reveal distal clavicle fracture left joint  Assessment:      Encounter Diagnosis   Name Primary?     Closed nondisplaced fracture of acromial end of left clavicle with routine healing, subsequent encounter Yes           Plan:      prn

## 2018-05-15 ENCOUNTER — OFFICE VISIT (OUTPATIENT)
Dept: ORTHOPEDIC SURGERY | Age: 83
End: 2018-05-15
Payer: MEDICARE

## 2018-05-15 DIAGNOSIS — M25.511 RIGHT SHOULDER PAIN, UNSPECIFIED CHRONICITY: Primary | ICD-10-CM

## 2018-05-15 DIAGNOSIS — M19.011 OSTEOARTHRITIS OF RIGHT SHOULDER, UNSPECIFIED OSTEOARTHRITIS TYPE: ICD-10-CM

## 2018-05-15 PROCEDURE — 1123F ACP DISCUSS/DSCN MKR DOCD: CPT | Performed by: ORTHOPAEDIC SURGERY

## 2018-05-15 PROCEDURE — 1036F TOBACCO NON-USER: CPT | Performed by: ORTHOPAEDIC SURGERY

## 2018-05-15 PROCEDURE — 20610 DRAIN/INJ JOINT/BURSA W/O US: CPT | Performed by: ORTHOPAEDIC SURGERY

## 2018-05-15 PROCEDURE — 1090F PRES/ABSN URINE INCON ASSESS: CPT | Performed by: ORTHOPAEDIC SURGERY

## 2018-05-15 PROCEDURE — 4040F PNEUMOC VAC/ADMIN/RCVD: CPT | Performed by: ORTHOPAEDIC SURGERY

## 2018-05-15 PROCEDURE — G8419 CALC BMI OUT NRM PARAM NOF/U: HCPCS | Performed by: ORTHOPAEDIC SURGERY

## 2018-05-15 PROCEDURE — 99213 OFFICE O/P EST LOW 20 MIN: CPT | Performed by: ORTHOPAEDIC SURGERY

## 2018-05-15 PROCEDURE — G8427 DOCREV CUR MEDS BY ELIG CLIN: HCPCS | Performed by: ORTHOPAEDIC SURGERY

## 2018-05-15 PROCEDURE — G8599 NO ASA/ANTIPLAT THER USE RNG: HCPCS | Performed by: ORTHOPAEDIC SURGERY

## 2018-05-15 RX ORDER — BETAMETHASONE SODIUM PHOSPHATE AND BETAMETHASONE ACETATE 3; 3 MG/ML; MG/ML
12 INJECTION, SUSPENSION INTRA-ARTICULAR; INTRALESIONAL; INTRAMUSCULAR; SOFT TISSUE ONCE
Status: COMPLETED | OUTPATIENT
Start: 2018-05-15 | End: 2018-05-15

## 2018-05-15 RX ORDER — BUPIVACAINE HYDROCHLORIDE 5 MG/ML
2 INJECTION, SOLUTION PERINEURAL ONCE
Status: COMPLETED | OUTPATIENT
Start: 2018-05-15 | End: 2018-05-15

## 2018-05-15 RX ORDER — LIDOCAINE HYDROCHLORIDE 10 MG/ML
2 INJECTION, SOLUTION EPIDURAL; INFILTRATION; INTRACAUDAL; PERINEURAL ONCE
Status: COMPLETED | OUTPATIENT
Start: 2018-05-15 | End: 2018-05-15

## 2018-05-15 RX ADMIN — LIDOCAINE HYDROCHLORIDE 2 ML: 10 INJECTION, SOLUTION EPIDURAL; INFILTRATION; INTRACAUDAL; PERINEURAL at 13:29

## 2018-05-15 RX ADMIN — BETAMETHASONE SODIUM PHOSPHATE AND BETAMETHASONE ACETATE 12 MG: 3; 3 INJECTION, SUSPENSION INTRA-ARTICULAR; INTRALESIONAL; INTRAMUSCULAR; SOFT TISSUE at 13:27

## 2018-05-15 RX ADMIN — BUPIVACAINE HYDROCHLORIDE 10 MG: 5 INJECTION, SOLUTION PERINEURAL at 13:28

## 2018-06-12 ENCOUNTER — OFFICE VISIT (OUTPATIENT)
Dept: ORTHOPEDIC SURGERY | Age: 83
End: 2018-06-12
Payer: MEDICARE

## 2018-06-12 DIAGNOSIS — M25.511 CHRONIC RIGHT SHOULDER PAIN: Primary | ICD-10-CM

## 2018-06-12 DIAGNOSIS — M19.011 ARTHRITIS OF RIGHT SHOULDER REGION: ICD-10-CM

## 2018-06-12 DIAGNOSIS — G89.29 CHRONIC RIGHT SHOULDER PAIN: Primary | ICD-10-CM

## 2018-06-12 PROCEDURE — G8419 CALC BMI OUT NRM PARAM NOF/U: HCPCS | Performed by: ORTHOPAEDIC SURGERY

## 2018-06-12 PROCEDURE — 20610 DRAIN/INJ JOINT/BURSA W/O US: CPT | Performed by: ORTHOPAEDIC SURGERY

## 2018-06-12 PROCEDURE — 4040F PNEUMOC VAC/ADMIN/RCVD: CPT | Performed by: ORTHOPAEDIC SURGERY

## 2018-06-12 PROCEDURE — 1090F PRES/ABSN URINE INCON ASSESS: CPT | Performed by: ORTHOPAEDIC SURGERY

## 2018-06-12 PROCEDURE — G8427 DOCREV CUR MEDS BY ELIG CLIN: HCPCS | Performed by: ORTHOPAEDIC SURGERY

## 2018-06-12 PROCEDURE — 1123F ACP DISCUSS/DSCN MKR DOCD: CPT | Performed by: ORTHOPAEDIC SURGERY

## 2018-06-12 PROCEDURE — 99213 OFFICE O/P EST LOW 20 MIN: CPT | Performed by: ORTHOPAEDIC SURGERY

## 2018-06-12 PROCEDURE — G8599 NO ASA/ANTIPLAT THER USE RNG: HCPCS | Performed by: ORTHOPAEDIC SURGERY

## 2018-06-12 PROCEDURE — 1036F TOBACCO NON-USER: CPT | Performed by: ORTHOPAEDIC SURGERY

## 2018-06-12 RX ORDER — BUPIVACAINE HYDROCHLORIDE 5 MG/ML
2 INJECTION, SOLUTION PERINEURAL ONCE
Status: COMPLETED | OUTPATIENT
Start: 2018-06-12 | End: 2018-06-12

## 2018-06-12 RX ORDER — BETAMETHASONE SODIUM PHOSPHATE AND BETAMETHASONE ACETATE 3; 3 MG/ML; MG/ML
12 INJECTION, SUSPENSION INTRA-ARTICULAR; INTRALESIONAL; INTRAMUSCULAR; SOFT TISSUE ONCE
Status: COMPLETED | OUTPATIENT
Start: 2018-06-12 | End: 2018-06-12

## 2018-06-12 RX ORDER — LIDOCAINE HYDROCHLORIDE 10 MG/ML
2 INJECTION, SOLUTION EPIDURAL; INFILTRATION; INTRACAUDAL; PERINEURAL ONCE
Status: COMPLETED | OUTPATIENT
Start: 2018-06-12 | End: 2018-06-12

## 2018-06-12 RX ADMIN — LIDOCAINE HYDROCHLORIDE 2 ML: 10 INJECTION, SOLUTION EPIDURAL; INFILTRATION; INTRACAUDAL; PERINEURAL at 15:03

## 2018-06-12 RX ADMIN — BUPIVACAINE HYDROCHLORIDE 10 MG: 5 INJECTION, SOLUTION PERINEURAL at 15:03

## 2018-06-12 RX ADMIN — BETAMETHASONE SODIUM PHOSPHATE AND BETAMETHASONE ACETATE 12 MG: 3; 3 INJECTION, SUSPENSION INTRA-ARTICULAR; INTRALESIONAL; INTRAMUSCULAR; SOFT TISSUE at 15:03

## 2018-07-12 ENCOUNTER — APPOINTMENT (OUTPATIENT)
Dept: GENERAL RADIOLOGY | Age: 83
End: 2018-07-12
Payer: MEDICARE

## 2018-07-12 ENCOUNTER — APPOINTMENT (OUTPATIENT)
Dept: CT IMAGING | Age: 83
End: 2018-07-12
Payer: MEDICARE

## 2018-07-12 ENCOUNTER — HOSPITAL ENCOUNTER (EMERGENCY)
Age: 83
Discharge: HOME | End: 2018-07-12
Attending: EMERGENCY MEDICINE
Payer: MEDICARE

## 2018-07-12 VITALS
DIASTOLIC BLOOD PRESSURE: 69 MMHG | WEIGHT: 155 LBS | SYSTOLIC BLOOD PRESSURE: 148 MMHG | HEART RATE: 60 BPM | BODY MASS INDEX: 25.02 KG/M2 | TEMPERATURE: 97.9 F | OXYGEN SATURATION: 100 % | RESPIRATION RATE: 16 BRPM

## 2018-07-12 DIAGNOSIS — M25.511 ACUTE PAIN OF RIGHT SHOULDER: ICD-10-CM
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
## 2018-07-12 LAB — GLUCOSE BLD-MCNC: 254 MG/DL (ref 65–105)

## 2018-07-12 PROCEDURE — 73502 X-RAY EXAM HIP UNI 2-3 VIEWS: CPT

## 2018-07-12 PROCEDURE — 72125 CT NECK SPINE W/O DYE: CPT

## 2018-07-12 PROCEDURE — 99284 EMERGENCY DEPT VISIT MOD MDM: CPT

## 2018-07-12 PROCEDURE — 82947 ASSAY GLUCOSE BLOOD QUANT: CPT

## 2018-07-12 PROCEDURE — 73030 X-RAY EXAM OF SHOULDER: CPT

## 2018-07-12 PROCEDURE — 72100 X-RAY EXAM L-S SPINE 2/3 VWS: CPT

## 2018-07-12 PROCEDURE — 70450 CT HEAD/BRAIN W/O DYE: CPT

## 2018-07-12 PROCEDURE — 71045 X-RAY EXAM CHEST 1 VIEW: CPT

## 2018-07-12 PROCEDURE — 73562 X-RAY EXAM OF KNEE 3: CPT

## 2018-07-12 ASSESSMENT — ENCOUNTER SYMPTOMS
BLOOD IN STOOL: 0
SHORTNESS OF BREATH: 0
WHEEZING: 0
VOMITING: 0
NAUSEA: 0
PHOTOPHOBIA: 0
DIARRHEA: 0
ABDOMINAL PAIN: 0
FACIAL SWELLING: 0
CHOKING: 0
TROUBLE SWALLOWING: 0
COLOR CHANGE: 0
STRIDOR: 0
CHEST TIGHTNESS: 0

## 2018-07-12 ASSESSMENT — PAIN SCALES - GENERAL: PAINLEVEL_OUTOF10: 6

## 2018-07-12 ASSESSMENT — PAIN DESCRIPTION - PAIN TYPE: TYPE: CHRONIC PAIN

## 2018-07-12 NOTE — ED NOTES
Bed: 04  Expected date:   Expected time:   Means of arrival:   Comments:     Wade Metz RN  07/12/18 [id number]

## 2018-07-12 NOTE — ED PROVIDER NOTES
agree with the chief complaint, past medical history, past surgical history, allergies, medications, social and family history as documented unless otherwise noted.     Griselda Albright, MD  Attending Emergency Physician            Griselda Albright, MD  07/12/18 1122

## 2018-07-12 NOTE — ED PROVIDER NOTES
Endoscopy, colon, diagnostic; Colonoscopy; lymph node dissection; Carotid endarterectomy (Left, 10/09/14); Cystocopy; fracture surgery; eye surgery (Bilateral); Diagnostic Cardiac Cath Lab Procedure; Fixation Kyphoplasty (10/06/2015); pacemaker placement (01/27/2009); Cardiac valve replacement (2004); Cataract removal with implant (Bilateral); pr egd transoral biopsy single/multiple (N/A, 11/1/2017); pr colon ca scrn not hi rsk ind (N/A, 11/1/2017); Upper gastrointestinal endoscopy (11/01/2017); and Colonoscopy (11/01/2017). Social History     Social History    Marital status:      Spouse name: N/A    Number of children: N/A    Years of education: N/A     Occupational History    Not on file. Social History Main Topics    Smoking status: Never Smoker    Smokeless tobacco: Never Used    Alcohol use No    Drug use: No    Sexual activity: Not on file     Other Topics Concern    Not on file     Social History Narrative    No narrative on file       Family History   Problem Relation Age of Onset    Cancer Mother         breast    Cancer Sister         kidney    Cancer Brother         melanoma    Other Father         black lung disease       Allergies:  Sulfa antibiotics; Codeine; Lidocaine; Meperidine; Penicillins; and Terramycin [oxytetracycline]    Home Medications:  Prior to Admission medications    Medication Sig Start Date End Date Taking? Authorizing Provider   bisacodyl (DULCOLAX) 5 MG EC tablet Take 2 tablets by mouth daily as needed for Constipation Patient is to purchase Dulcolax tablets over the counter if not covered by [employer]. Take four Dulcolax tablets as directed. Please follow further instructions as listed on your Colonoscopy Preparation sheet.  1/31/18   Maria De Jesus Maurer MD   ranitidine (ZANTAC) 150 MG tablet Take 1 tablet by mouth nightly 1/31/18   Maria De Jesus Maurer MD   furosemide (LASIX) 20 MG tablet Take 1 tablet by mouth daily 11/7/17   Kaila Sahu MD   Insulin Aspart (NOVOLOG FLEXPEN SC) Inject 10 Units into the skin Daily with supper    Historical Provider, MD   trospium (SANCTURA) 20 MG tablet Take 1 tablet by mouth 2 times daily  Patient taking differently: Take 20 mg by mouth daily  8/23/17   Tiffanie Buitrago MD   ACCU-CHEK SMARTVIEW strip  7/31/16   Historical Provider, MD MCKEON ALCOHOL SWABS 70 % PADS use as directed four times a day 6/30/16   Historical Provider, MD SPIVEY PENTIPS 31G X 5 MM MISC  9/15/15   Historical Provider, MD   insulin glargine (LANTUS) 100 UNIT/ML injection vial Inject 34 Units into the skin Daily. Patient taking differently: Inject 30 Units into the skin Daily  1/13/15   Brigida Severance,    aspirin 325 MG tablet Take 1 tablet by mouth daily. 9/29/14   Ruddy Peters,    Insulin Aspart (NOVOLOG FLEXPEN SC) Inject 8 Units into the skin daily (with breakfast)     Historical Provider, MD   alendronate (FOSAMAX) 70 MG tablet Take 70 mg by mouth every 7 days Friday 10/6/13   Historical Provider, MD   escitalopram (LEXAPRO) 10 MG tablet Take 20 mg by mouth daily. 8/27/13   Historical Provider, MD   sotalol (BETAPACE) 80 MG tablet Take 80 mg by mouth daily  10/1/13   Historical Provider, MD       REVIEW OF SYSTEMS    (2-9 systems for level 4, 10 or more for level 5)      Review of Systems   Constitutional: Negative for activity change, appetite change, chills, diaphoresis and fever. HENT: Negative for facial swelling, nosebleeds and trouble swallowing. Eyes: Negative for photophobia and visual disturbance. Respiratory: Negative for choking, chest tightness, shortness of breath, wheezing and stridor. Cardiovascular: Negative for chest pain and leg swelling. Gastrointestinal: Negative for abdominal pain, blood in stool, diarrhea, nausea and vomiting. Genitourinary: Negative for decreased urine volume, difficulty urinating, dysuria, enuresis and hematuria. Musculoskeletal: Positive for arthralgias.  Negative for joint swelling, pm COMPARISON: None HISTORY: ORDERING SYSTEM PROVIDED HISTORY: fall TECHNOLOGIST PROVIDED HISTORY: Reason for exam:->fall Ordering Physician Provided Reason for Exam: fall Acuity: Acute Type of Exam: Initial FINDINGS: No acute fracture. Severe glenohumeral joint space narrowing with bone-on-bone contact. Severe acromiohumeral interval narrowing which can be seen with a complete rotator cuff tear. Mild acromioclavicular degenerative changes. Multiple remote right rib fractures. No acute radiographic findings. Severe glenohumeral degenerative changes with probable complete rotator cuff tear. Xr Knee Right (3 Views)    Result Date: 7/12/2018  EXAMINATION: 3 XRAY VIEWS OF THE RIGHT KNEE 7/12/2018 5:33 pm COMPARISON: None HISTORY: ORDERING SYSTEM PROVIDED HISTORY: fall TECHNOLOGIST PROVIDED HISTORY: Reason for exam:->fall Ordering Physician Provided Reason for Exam: fall Acuity: Acute Type of Exam: Initial FINDINGS: Right knee total arthroplasty components appear intact. No acute periprosthetic fracture. Small joint effusion. No acute fracture. Small joint effusion. Ct Head Wo Contrast    Result Date: 7/12/2018  EXAMINATION: CT OF THE HEAD WITHOUT CONTRAST  7/12/2018 5:22 pm TECHNIQUE: CT of the head was performed without the administration of intravenous contrast. Dose modulation, iterative reconstruction, and/or weight based adjustment of the mA/kV was utilized to reduce the radiation dose to as low as reasonably achievable. COMPARISON: January 17, 2018 HISTORY: ORDERING SYSTEM PROVIDED HISTORY: fall TECHNOLOGIST PROVIDED HISTORY: Ordering Physician Provided Reason for Exam: fall, head and neck pain Acuity: Acute Type of Exam: Initial FINDINGS: BRAIN/VENTRICLES: No acute intracranial hemorrhage. No mass effect. No midline shift. Moderate prominence of the ventricles and sulci is consistent with atrophy.   Mild periventricular and subcortical white matter hypodensities are nonspecific but likely changes. Right 10th rib fracture appears remote. Mild pulmonary edema. Right 10th rib fracture appears remote. Xr Hip 2-3 Vw W Pelvis Left    Result Date: 7/12/2018  EXAMINATION: SINGLE XRAY VIEW OF THE PELVIS AND 2 XRAY VIEWS LEFT HIP 7/12/2018 5:33 pm COMPARISON: AP pelvis and radiographs left hip 12/30/2012. HISTORY: ORDERING SYSTEM PROVIDED HISTORY: fall TECHNOLOGIST PROVIDED HISTORY: Reason for exam:->fall Ordering Physician Provided Reason for Exam: fall Acuity: Acute Type of Exam: Initial FINDINGS: Old healed fractures right superior and inferior pubic rami. Otherwise unremarkable appearance of the right and left hemipelvis, articulating normally at the SI joints and pubic symphysis. Bilaterally symmetrical SI joint osteoarthritis is noted. Visualized portions of the proximal left femur appear intact. The left femoral head aligns normally at the left acetabulum. Left hip joint appears well maintained. No acute osseous abnormality evident on AP pelvis and left hip radiographs. Bilaterally symmetrical SI joint osteoarthritis. If pain persists or worsens, then additional evaluation with MRI is indicated to ensure no underlying radiographically occult process such as fracture, AVN or transient osteoporosis. [facility]/EMERGENCY DEPARTMENT COURSE:     4:53 PM    ED Course as of Jul 12 2314   u Jul 12, 2018   1851 Imaging reveals no acute fractures, acute shoulder reveals possible complete rotator cuff care. Patient told nursing staff that she has had chronic right shoulder pain admitted to them that this was not new after the fall.  [KW]   1851 Patient will be discharged with instructions to follow-up with her primary care doc and also given information to call and follow-up with an orthopedic surgeon for further evaluation.   [KW]   2107 Patient awaiting transportation back to her house  [KW]      ED Course User Index  [KW] Garry Reid DO PROCEDURES:  None    CONSULTS:  None    CRITICAL CARE:  None    FINAL IMPRESSION      1. Fall, initial encounter    2.  Acute pain of right shoulder          DISPOSITION / PLAN     DISPOSITION Decision To Discharge    PATIENT REFERRED TO:  [place] [address] [address] [address] [facility] [address] [phone]    Schedule an appointment as soon as possible for a visit   For Follow Up    Roe Cali MD  [address] [place] [address] [phone] [id number] [phone]    Schedule an appointment as soon as possible for a visit   For Follow Up      DISCHARGE MEDICATIONS:  New Prescriptions    No medications on file       Ubaldo Carmen DO  Emergency Medicine Resident    (Please note that portions of this note were completed with a voice recognition program.  Efforts were made to edit the dictations but occasionally words are mis-transcribed.)       Ubaldo Carmen DO  07/13/18 [id number]

## 2018-07-29 ENCOUNTER — APPOINTMENT (OUTPATIENT)
Dept: GENERAL RADIOLOGY | Age: 83
End: 2018-07-29
Payer: MEDICARE

## 2018-07-29 ENCOUNTER — APPOINTMENT (OUTPATIENT)
Dept: CT IMAGING | Age: 83
End: 2018-07-29
Payer: MEDICARE

## 2018-07-29 ENCOUNTER — HOSPITAL ENCOUNTER (EMERGENCY)
Age: 83
Discharge: HOME | End: 2018-07-29
Attending: EMERGENCY MEDICINE
Payer: MEDICARE

## 2018-07-29 VITALS
SYSTOLIC BLOOD PRESSURE: 160 MMHG | DIASTOLIC BLOOD PRESSURE: 44 MMHG | WEIGHT: 147 LBS | OXYGEN SATURATION: 100 % | RESPIRATION RATE: 17 BRPM | BODY MASS INDEX: 23.07 KG/M2 | HEIGHT: 67 IN | TEMPERATURE: 97.6 F | HEART RATE: 60 BPM

## 2018-07-29 VITALS
HEIGHT: 67 IN | HEART RATE: 60 BPM | TEMPERATURE: 97.6 F | RESPIRATION RATE: 18 BRPM | SYSTOLIC BLOOD PRESSURE: 118 MMHG | BODY MASS INDEX: 23.07 KG/M2 | OXYGEN SATURATION: 98 % | WEIGHT: 147 LBS | DIASTOLIC BLOOD PRESSURE: 78 MMHG

## 2018-07-29 DIAGNOSIS — G89.29 CHRONIC RIGHT SHOULDER PAIN: Primary | ICD-10-CM

## 2018-07-29 DIAGNOSIS — M25.511 CHRONIC RIGHT SHOULDER PAIN: Primary | ICD-10-CM

## 2018-07-29 DIAGNOSIS — R07.81 RIB PAIN ON LEFT SIDE: ICD-10-CM

## 2018-07-29 DIAGNOSIS — M25.562 ACUTE PAIN OF LEFT KNEE: ICD-10-CM

## 2018-07-29 DIAGNOSIS — M79.605 LEFT LEG PAIN: Primary | ICD-10-CM

## 2018-07-29 LAB
-: ABNORMAL
ABSOLUTE EOS #: 0 K/UL (ref 0–0.4)
ABSOLUTE IMMATURE GRANULOCYTE: ABNORMAL K/UL (ref 0–0.3)
ABSOLUTE LYMPH #: 1 K/UL (ref 1–4.8)
ABSOLUTE MONO #: 0.6 K/UL (ref 0.1–1.3)
AMORPHOUS: ABNORMAL
ANION GAP SERPL CALCULATED.3IONS-SCNC: 10 MMOL/L (ref 9–17)
BACTERIA: ABNORMAL
BASOPHILS # BLD: 1 % (ref 0–2)
BASOPHILS ABSOLUTE: 0.1 K/UL (ref 0–0.2)
BILIRUBIN URINE: NEGATIVE
BUN BLDV-MCNC: 30 MG/DL (ref 8–23)
BUN/CREAT BLD: ABNORMAL (ref 9–20)
CALCIUM SERPL-MCNC: 10 MG/DL (ref 8.6–10.4)
CASTS UA: ABNORMAL /LPF
CHLORIDE BLD-SCNC: 102 MMOL/L (ref 98–107)
CO2: 28 MMOL/L (ref 20–31)
COLOR: YELLOW
COMMENT UA: ABNORMAL
CREAT SERPL-MCNC: 0.95 MG/DL (ref 0.5–0.9)
CRYSTALS, UA: ABNORMAL /HPF
DIFFERENTIAL TYPE: ABNORMAL
EOSINOPHILS RELATIVE PERCENT: 1 % (ref 0–4)
EPITHELIAL CELLS UA: ABNORMAL /HPF
GFR AFRICAN AMERICAN: >60 ML/MIN
GFR NON-AFRICAN AMERICAN: 55 ML/MIN
GFR SERPL CREATININE-BSD FRML MDRD: ABNORMAL ML/MIN/{1.73_M2}
GFR SERPL CREATININE-BSD FRML MDRD: ABNORMAL ML/MIN/{1.73_M2}
GLUCOSE BLD-MCNC: 111 MG/DL (ref 70–99)
GLUCOSE URINE: NEGATIVE
HCT VFR BLD CALC: 35 % (ref 36–46)
HEMOGLOBIN: 11.8 G/DL (ref 12–16)
IMMATURE GRANULOCYTES: ABNORMAL %
KETONES, URINE: ABNORMAL
LEUKOCYTE ESTERASE, URINE: NEGATIVE
LYMPHOCYTES # BLD: 13 % (ref 24–44)
MCH RBC QN AUTO: 29.6 PG (ref 26–34)
MCHC RBC AUTO-ENTMCNC: 33.6 G/DL (ref 31–37)
MCV RBC AUTO: 88 FL (ref 80–100)
MONOCYTES # BLD: 8 % (ref 1–7)
MUCUS: ABNORMAL
NITRITE, URINE: NEGATIVE
NRBC AUTOMATED: ABNORMAL PER 100 WBC
OTHER OBSERVATIONS UA: ABNORMAL
PDW BLD-RTO: 14.5 % (ref 11.5–14.9)
PH UA: 6 (ref 5–8)
PLATELET # BLD: 290 K/UL (ref 150–450)
PLATELET ESTIMATE: ABNORMAL
PMV BLD AUTO: 7.8 FL (ref 6–12)
POTASSIUM SERPL-SCNC: 4.7 MMOL/L (ref 3.7–5.3)
PROTEIN UA: ABNORMAL
RBC # BLD: 3.98 M/UL (ref 4–5.2)
RBC # BLD: ABNORMAL 10*6/UL
RBC UA: ABNORMAL /HPF
RENAL EPITHELIAL, UA: ABNORMAL /HPF
SEG NEUTROPHILS: 77 % (ref 36–66)
SEGMENTED NEUTROPHILS ABSOLUTE COUNT: 6 K/UL (ref 1.3–9.1)
SODIUM BLD-SCNC: 140 MMOL/L (ref 135–144)
SPECIFIC GRAVITY UA: 1.02 (ref 1–1.03)
TRICHOMONAS: ABNORMAL
TURBIDITY: CLEAR
URINE HGB: NEGATIVE
UROBILINOGEN, URINE: NORMAL
WBC # BLD: 7.8 K/UL (ref 3.5–11)
WBC # BLD: ABNORMAL 10*3/UL
WBC UA: ABNORMAL /HPF
YEAST: ABNORMAL

## 2018-07-29 PROCEDURE — 36415 COLL VENOUS BLD VENIPUNCTURE: CPT

## 2018-07-29 PROCEDURE — 73030 X-RAY EXAM OF SHOULDER: CPT

## 2018-07-29 PROCEDURE — 71250 CT THORAX DX C-: CPT

## 2018-07-29 PROCEDURE — 6370000000 HC RX 637 (ALT 250 FOR IP): Performed by: PHYSICIAN ASSISTANT

## 2018-07-29 PROCEDURE — 85025 COMPLETE CBC W/AUTO DIFF WBC: CPT

## 2018-07-29 PROCEDURE — 73562 X-RAY EXAM OF KNEE 3: CPT

## 2018-07-29 PROCEDURE — 81001 URINALYSIS AUTO W/SCOPE: CPT

## 2018-07-29 PROCEDURE — 72170 X-RAY EXAM OF PELVIS: CPT

## 2018-07-29 PROCEDURE — 70450 CT HEAD/BRAIN W/O DYE: CPT

## 2018-07-29 PROCEDURE — 80048 BASIC METABOLIC PNL TOTAL CA: CPT

## 2018-07-29 PROCEDURE — 99284 EMERGENCY DEPT VISIT MOD MDM: CPT

## 2018-07-29 PROCEDURE — 72125 CT NECK SPINE W/O DYE: CPT

## 2018-07-29 RX ORDER — HYDROCODONE BITARTRATE AND ACETAMINOPHEN 5; 325 MG/1; MG/1
1 TABLET ORAL ONCE
Status: COMPLETED | OUTPATIENT
Start: 2018-07-29 | End: 2018-07-29

## 2018-07-29 RX ADMIN — HYDROCODONE BITARTRATE AND ACETAMINOPHEN 1 TABLET: 5; 325 TABLET ORAL at 20:43

## 2018-07-29 ASSESSMENT — ENCOUNTER SYMPTOMS
SHORTNESS OF BREATH: 0
ABDOMINAL PAIN: 0
EYE PAIN: 0
BACK PAIN: 0
VOMITING: 0
RHINORRHEA: 0
NAUSEA: 0
EYE REDNESS: 0
COUGH: 0

## 2018-07-29 ASSESSMENT — PAIN DESCRIPTION - LOCATION: LOCATION: LEG

## 2018-07-29 ASSESSMENT — PAIN SCALES - GENERAL
PAINLEVEL_OUTOF10: 10
PAINLEVEL_OUTOF10: 5
PAINLEVEL_OUTOF10: 7

## 2018-07-29 ASSESSMENT — PAIN DESCRIPTION - DESCRIPTORS: DESCRIPTORS: ACHING

## 2018-07-29 ASSESSMENT — PAIN DESCRIPTION - PAIN TYPE: TYPE: ACUTE PAIN

## 2018-07-29 ASSESSMENT — PAIN DESCRIPTION - ORIENTATION: ORIENTATION: LEFT

## 2018-07-29 NOTE — ED PROVIDER NOTES
[address] ED  eMERGENCY dEPARTMENT eNCOUnter      Pt Name: Emma Abbasi  MRN: [id number]  [place] 8/25/1927  Date of evaluation: 7/29/2018  Provider: Shanelle Daley PA-C    CHIEF COMPLAINT       Chief Complaint   Patient presents with    Shoulder Pain    Rib Pain           HISTORY OF PRESENT ILLNESS  (Location/Symptom, Timing/Onset, Context/Setting, Quality, Duration, Modifying Factors, Severity.)   Emma Abbasi is a 80 y.o. female who presents to the emergency department via EMS with complaints of right shoulder pain, left rib pain, and left knee pain after a fall. Pt states she rolled off of her bed several hours ago. Pt denies hitting head or loc. States she has pain with movement of left arm or deep breath. Pt denies numbness, tingling. Denies headache, neck pain, lightheadedness, dizziness, sob, back pain, hip pain, arm pain, loss of bowel or bladder control. Pt takes aspirin. Pt lives on her own and is ambulatory with a walker. Pt was seen this morning for hip pain after a fall early this morning. No other complaints. Nursing Notes were reviewed. REVIEW OF SYSTEMS    (2-9 systems for level 4, 10 or more for level 5)     Review of Systems   Rib pain, shoulder pain, knee pain     Except as noted above the remainder of the review of systems was reviewed and negative.        PAST MEDICAL HISTORY     Past Medical History:   Diagnosis Date    Anxiety     At high risk for falls     Atrial fibrillation (HCC)     Caffeine use none    Cancer (HCC)     Cervical cancer (HCC)     CHF (congestive heart failure) (HCC)     Chronic back pain     Closed fracture of cervical vertebra without mention of spinal cord injury     Constipation     DDD (degenerative disc disease)     Depression     DJD (degenerative joint disease)     GERD (gastroesophageal reflux disease)     Hearing loss     Hematuria     History of blood transfusion     Hypertension     Multiple falls     further instructions as listed on your Colonoscopy Preparation sheet. ESCITALOPRAM (LEXAPRO) 10 MG TABLET    Take 20 mg by mouth daily. FUROSEMIDE (LASIX) 20 MG TABLET    Take 1 tablet by mouth daily    INSULIN ASPART (NOVOLOG FLEXPEN SC)    Inject 8 Units into the skin daily (with breakfast)     INSULIN ASPART (NOVOLOG FLEXPEN SC)    Inject 10 Units into the skin Daily with supper    INSULIN GLARGINE (LANTUS) 100 UNIT/ML INJECTION VIAL    Inject 34 Units into the skin Daily. RA ALCOHOL SWABS 70 % PADS    use as directed four times a day    RANITIDINE (ZANTAC) 150 MG TABLET    Take 1 tablet by mouth nightly    SOTALOL (BETAPACE) 80 MG TABLET    Take 80 mg by mouth daily     TROSPIUM (SANCTURA) 20 MG TABLET    Take 1 tablet by mouth 2 times daily    UNIFINE PENTIPS 31G X 5 MM MISC           ALLERGIES     Sulfa antibiotics; Codeine; Lidocaine; Meperidine; Penicillins; and Terramycin [oxytetracycline]    FAMILY HISTORY       Family History   Problem Relation Age of Onset    Cancer Mother         breast    Cancer Sister         kidney    Cancer Brother         melanoma    Other Father         black lung disease     Family Status   Relation Status    Mother     Sister (Not Specified)    Brother (Not Specified)    Father       None otherwise stated in nurses notes    SOCIAL HISTORY      reports that she has never smoked. She has never used smokeless tobacco. She reports that she does not drink alcohol or use drugs. lives at home with others     PHYSICAL EXAM    (up to 7 for level 4, 8 or more for level 5)     ED Triage Vitals [18 1930]   BP Temp Temp Source Pulse Resp SpO2 Height Weight   (!) 160/44 97.6 °F (36.4 °C) Oral 60 -- 100 % 5' 7\" (1.702 m) 147 lb (66.7 kg)       Physical Exam   Constitutional: She is oriented to person, place, and time and well-developed, well-nourished, and in no distress. No distress. Lying in bed. Alert.     HENT:   Head: Normocephalic and atraumatic. Not macrocephalic and not microcephalic. Head is without raccoon's eyes, without Connor's sign, without abrasion, without contusion, without laceration, without right periorbital erythema and without left periorbital erythema. Hair is normal.   Right Ear: Hearing and external ear normal.   Left Ear: Hearing and external ear normal.   Nose: Nose normal. No sinus tenderness. Mouth/Throat: Uvula is midline, oropharynx is clear and moist and mucous membranes are normal. No oropharyngeal exudate, posterior oropharyngeal edema, posterior oropharyngeal erythema or tonsillar abscesses. Bruising under right eye (old) no facial tenderness. Eyes: Conjunctivae and EOM are normal. Pupils are equal, round, and reactive to light. Neck: Normal range of motion and full passive range of motion without pain. Neck supple. No spinous process tenderness and no muscular tenderness present. No neck rigidity. No edema, no erythema and normal range of motion present. Cardiovascular: Normal rate, regular rhythm, S1 normal, S2 normal and normal heart sounds. Exam reveals no gallop and no friction rub. No murmur heard. Pulmonary/Chest: Effort normal and breath sounds normal. No accessory muscle usage. No respiratory distress. She has no decreased breath sounds. She has no wheezes. She has no rhonchi. She has no rales. Chest wall is not dull to percussion. She exhibits bony tenderness. She exhibits no mass, no tenderness, no laceration, no crepitus, no edema, no deformity, no swelling and no retraction. No chest tenderness. Abdominal: Soft. Normal appearance. There is no tenderness. There is no rigidity, no rebound and no guarding. Musculoskeletal: She exhibits tenderness. Right shoulder: She exhibits decreased range of motion, tenderness, bony tenderness, pain and decreased strength. She exhibits no swelling, no effusion, no crepitus, no deformity, no laceration, no spasm and normal pulse. KNEE LEFT (3 VIEWS)   Preliminary Result   1. Status post left total knee arthroplasty. No complication. 2. No knee joint effusion. 3. Mild prepatellar soft tissue swelling. Xr Lumbar Spine (2-3 Views)    Result Date: 7/12/2018  EXAMINATION: 3 XRAY VIEWS OF THE LUMBAR SPINE 7/12/2018 5:33 pm COMPARISON: Radiographs lumbar spine 01/17/2018 HISTORY: ORDERING SYSTEM PROVIDED HISTORY: fall TECHNOLOGIST PROVIDED HISTORY: Reason for exam:->fall FINDINGS: Bone mineralization appears abnormally low. 5 typical lumbar vertebra present maintain normal height. Grade 1 degenerative retrolisthesis L1 on L2 and L2 on L3 and grade 1-2 anterolisthesis L5 on S1. Moderate to severe degenerative disc disease and spondylosis is noted throughout the lumbar spine. Mild, bilaterally symmetrical SI joint osteoarthritis is noted. Calcific atherosclerotic disease aorta. 1. Grade 1 degenerative retrolisthesis L1 on L2 and L2 on L3 and grade 1-2 anterolisthesis L5 on S1. 2. Bones appear osteoporotic. 3. Moderate to severe degenerative changes throughout the lumbar spine. 4. Mild, bilaterally symmetrical SI joint osteoarthritis. 5. Calcific atherosclerotic disease aorta. Xr Pelvis (1-2 Views)    Result Date: 7/29/2018  EXAMINATION: SINGLE XRAY VIEW OF THE PELVIS 7/29/2018 9:10 am COMPARISON: Pelvic radiographs 07/12/2018 HISTORY: ORDERING SYSTEM PROVIDED HISTORY: Trauma TECHNOLOGIST PROVIDED HISTORY: Reason for exam:->Trauma Ordering Physician Provided Reason for Exam: bruising Acuity: Acute Type of Exam: Initial FINDINGS: Hips are in anatomic alignment. Unchanged chronic deformity of the right superior and inferior pubic ring. Left pubic ring is intact. Pubic symphysis is maintained. Sclerosis along the right greater than left sacroiliac joints is unchanged. Lumbar spondylosis. Arcuate lines of the upper sacrum are smooth. No appreciable soft tissue abnormality. No acute displaced fracture or malalignment. Unchanged healed deformity of the right pubic ring. Lumbar spondylosis and asymmetric sacroiliitis is similar to prior exam.     Xr Shoulder Right (min 2 Views)    Result Date: 7/12/2018  EXAMINATION: 3 XRAY VIEWS OF THE RIGHT SHOULDER 7/12/2018 5:33 pm COMPARISON: None HISTORY: ORDERING SYSTEM PROVIDED HISTORY: fall TECHNOLOGIST PROVIDED HISTORY: Reason for exam:->fall Ordering Physician Provided Reason for Exam: fall Acuity: Acute Type of Exam: Initial FINDINGS: No acute fracture. Severe glenohumeral joint space narrowing with bone-on-bone contact. Severe acromiohumeral interval narrowing which can be seen with a complete rotator cuff tear. Mild acromioclavicular degenerative changes. Multiple remote right rib fractures. No acute radiographic findings. Severe glenohumeral degenerative changes with probable complete rotator cuff tear. Xr Knee Right (3 Views)    Result Date: 7/12/2018  EXAMINATION: 3 XRAY VIEWS OF THE RIGHT KNEE 7/12/2018 5:33 pm COMPARISON: None HISTORY: ORDERING SYSTEM PROVIDED HISTORY: fall TECHNOLOGIST PROVIDED HISTORY: Reason for exam:->fall Ordering Physician Provided Reason for Exam: fall Acuity: Acute Type of Exam: Initial FINDINGS: Right knee total arthroplasty components appear intact. No acute periprosthetic fracture. Small joint effusion. No acute fracture. Small joint effusion. Ct Head Wo Contrast    Result Date: 7/12/2018  EXAMINATION: CT OF THE HEAD WITHOUT CONTRAST  7/12/2018 5:22 pm TECHNIQUE: CT of the head was performed without the administration of intravenous contrast. Dose modulation, iterative reconstruction, and/or weight based adjustment of the mA/kV was utilized to reduce the radiation dose to as low as reasonably achievable.  COMPARISON: January 17, 2018 HISTORY: ORDERING SYSTEM PROVIDED HISTORY: fall TECHNOLOGIST PROVIDED HISTORY: Ordering Physician Provided Reason for Exam: fall, head and neck pain Acuity: Acute Type of Exam: Initial FINDINGS: BRAIN/VENTRICLES: No acute intracranial hemorrhage. No mass effect. No midline shift. Moderate prominence of the ventricles and sulci is consistent with atrophy. Mild periventricular and subcortical white matter hypodensities are nonspecific but likely represent microvascular disease. ORBITS: The visualized portion of the orbits demonstrate no acute abnormality. SINUSES: The visualized paranasal sinuses and mastoid air cells demonstrate no acute abnormality. SOFT TISSUES/SKULL:  No acute abnormality of the visualized skull or soft tissues. No acute intracranial abnormality. Ct Cervical Spine Wo Contrast    Result Date: 7/12/2018  EXAMINATION: CT OF THE CERVICAL SPINE WITHOUT CONTRAST 7/12/2018 5:26 pm TECHNIQUE: CT of the cervical spine was performed without the administration of intravenous contrast. Multiplanar reformatted images are provided for review. Dose modulation, iterative reconstruction, and/or weight based adjustment of the mA/kV was utilized to reduce the radiation dose to as low as reasonably achievable. COMPARISON: January 10, 2015 HISTORY: ORDERING SYSTEM PROVIDED HISTORY: fall TECHNOLOGIST PROVIDED HISTORY: Ordering Physician Provided Reason for Exam: fall, head and neck pain Acuity: Acute Type of Exam: Initial FINDINGS: BONES/ALIGNMENT: No acute fracture. Minimal C4-C5 anterolisthesis is unchanged. DEGENERATIVE CHANGES: Multilevel degenerative changes are most significant within the lower cervical spine. SOFT TISSUES: No prevertebral soft tissue swelling. Scarring is seen at the lung apices. Cervical spine degenerative changes without acute findings.      Xr Chest Portable    Result Date: 7/12/2018  EXAMINATION: SINGLE XRAY VIEW OF THE CHEST 7/12/2018 5:33 pm COMPARISON: November 3, 2017 HISTORY: ORDERING SYSTEM PROVIDED HISTORY: fall TECHNOLOGIST PROVIDED HISTORY: Reason for exam:->fall Ordering Physician Provided Reason for Exam: fall Acuity: Acute Type of Exam: Initial FINDINGS:

## 2018-07-29 NOTE — ED NOTES
Patient sleeping in bed, respirations even non-labored. Spoke to patient's daughter who states she will be here at undetermined time to pick patient up to take her back home.         Nicol Mejia RN  07/29/18 [id number]

## 2018-07-29 NOTE — ED PROVIDER NOTES
[address]  eMERGENCY dEPARTMENT eNCOUnter      Pt Name: Jordan Mack  MRN: [id number]  [place] 8/25/1927  Date of evaluation: 7/29/2018  PCP:    Sydnie Cooper MD      CHIEF COMPLAINT       Chief Complaint   Patient presents with    Leg Pain         HISTORY OF PRESENT ILLNESS      Jordan Mack is a 80 y.o. female who presents For evaluation after a fall. Patient states that occasionally she does fall however she does wear a walker. Patient states that her main pain was left side. She called EMS to Get checked out. She also here with her boyfriend. According to reports  and [place] [id number] care involved in getting her home health to come assist her at home. Patient otherwise states she is fine without any other complaints       REVIEW OF SYSTEMS         Review of Systems   Constitutional: Negative for chills and fever. HENT: Negative for congestion and rhinorrhea. Eyes: Negative for pain and redness. Respiratory: Negative for cough and shortness of breath. Cardiovascular: Negative for chest pain and palpitations. Gastrointestinal: Negative for abdominal pain, nausea and vomiting. Genitourinary: Negative for dysuria, flank pain and urgency. Musculoskeletal: Negative for back pain, myalgias, neck pain and neck stiffness. Left upper leg pain   Skin: Negative for rash. Neurological: Negative for light-headedness and headaches. Hematological: Does not bruise/bleed easily.           PAST MEDICAL HISTORY     Past Medical History:   Diagnosis Date    Anxiety     At high risk for falls     Atrial fibrillation (HCC)     Caffeine use none    Cancer (HCC)     Cervical cancer (HCC)     CHF (congestive heart failure) (HCC)     Chronic back pain     Closed fracture of cervical vertebra without mention of spinal cord injury     Constipation     DDD (degenerative disc disease)     Depression     DJD (degenerative joint disease)     GERD Her respiration is 18 and oxygen saturation is 98%. Physical Exam   Constitutional: She is oriented to person, place, and time. She appears well-developed and well-nourished. HENT:   Head: Normocephalic and atraumatic. Nose: Nose normal.   Mouth/Throat: Oropharynx is clear and moist.   Eyes: Conjunctivae and EOM are normal. Pupils are equal, round, and reactive to light. Neck: Normal range of motion. Neck supple. Cardiovascular: Normal rate, regular rhythm, normal heart sounds and intact distal pulses. Pulmonary/Chest: Effort normal and breath sounds normal.   Abdominal: Soft. Bowel sounds are normal.   Musculoskeletal: Normal range of motion. Mild tenderness over left hip however full range of motion. The little bruising over the suprapubic area as well   Neurological: She is alert and oriented to person, place, and time. Skin: Skin is warm and dry. Nursing note and vitals reviewed. DIFFERENTIAL DIAGNOSIS/ MDM:     Patient here with painful home. Patient with good range of motion of the left hip and ambulatory as her normal.  Some basic blood work urinalysis and x-ray will be done. 0945: Patient was stable workup at this time. Talk with patient patient does want to go home. Again she is working with primary care physician and have a home health come visit emesis at home as well. DIAGNOSTIC RESULTS         RADIOLOGY:   I directly visualized the following  images and reviewed the radiologist interpretations:  XR PELVIS (1-2 VIEWS)   Final Result   No acute displaced fracture or malalignment. Unchanged healed deformity of the right pubic ring.       Lumbar spondylosis and asymmetric sacroiliitis is similar to prior exam.                LABS:  Labs Reviewed   CBC WITH AUTO DIFFERENTIAL - Abnormal; Notable for the following:        Result Value    RBC 3.98 (*)     Hemoglobin 11.8 (*)     Hematocrit 35.0 (*)     Seg Neutrophils 77 (*)     Lymphocytes 13 (*)     Monocytes 8 (*)

## 2018-07-30 NOTE — ED PROVIDER NOTES
[address]  eMERGENCY dEPARTMENT eNCOUnter   Independent Attestation     Pt Name: Dulce Maria Meyers  MRN: [id number]  [place] 8/25/1927  Date of evaluation: 7/30/18     Dulce Maria Meyers is a 80 y.o. female with CC: Shoulder Pain and Rib Pain      Based on the medical record the care appears appropriate. I was personally available for consultation in the Emergency Department.     Jamison Franco MD  Attending Emergency Physician                   Jamison Franco MD  07/30/18 [address] Corley MD  07/30/18 9025

## 2018-07-30 NOTE — ED NOTES
Pt spoke with patient and patients niece about pt needing help with ADLs at home. Niece agrees and states will talk with her daughter. Pt referred to home care by [facility] in ED. Pt needs moderate assistance to restroom.      Chapo Flores RN  07/29/18 0813

## 2018-07-30 NOTE — ED NOTES
Pt arrives to facility via EMS who states pt has called them 6 times in 14 hours for minor things such as turning off the light or fan. Pt has had multiple falls according to EMS. Pt safely moved to cot at this time.      Janelle Hines RN  07/29/18 2024

## (undated) DEVICE — BITEBLOCK 54FR W/ DENT RIM BLOX

## (undated) DEVICE — CANNULA NSL AD L2IN ETCO2 SAMP SFT CRUSH RESIST FEM AIRLFE

## (undated) DEVICE — JELLY,LUBE,STERILE,FLIP TOP,TUBE,2-OZ: Brand: MEDLINE

## (undated) DEVICE — FORCEPS BX OVL CUP FEN L CAP DISPOSABLE 160CM L RAD JAW 4

## (undated) DEVICE — DEFENDO AIR WATER SUCTION AND BIOPSY VALVE KIT FOR  OLYMPUS: Brand: DEFENDO AIR/WATER/SUCTION AND BIOPSY VALVE

## (undated) DEVICE — SYRINGE MED 50ML LUERLOCK TIP